# Patient Record
Sex: FEMALE | Race: WHITE | NOT HISPANIC OR LATINO | Employment: STUDENT | URBAN - METROPOLITAN AREA
[De-identification: names, ages, dates, MRNs, and addresses within clinical notes are randomized per-mention and may not be internally consistent; named-entity substitution may affect disease eponyms.]

---
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
## 2021-01-07 ENCOUNTER — OFFICE VISIT (OUTPATIENT)
Dept: CARDIOLOGY CLINIC | Facility: CLINIC | Age: 21
End: 2021-01-07
Payer: COMMERCIAL

## 2021-01-07 VITALS
HEART RATE: 70 BPM | DIASTOLIC BLOOD PRESSURE: 72 MMHG | SYSTOLIC BLOOD PRESSURE: 114 MMHG | OXYGEN SATURATION: 98 % | BODY MASS INDEX: 22.44 KG/M2 | WEIGHT: 143 LBS | HEIGHT: 67 IN | TEMPERATURE: 97.7 F

## 2021-01-07 DIAGNOSIS — E78.5 HYPERLIPIDEMIA, UNSPECIFIED HYPERLIPIDEMIA TYPE: Primary | ICD-10-CM

## 2021-01-07 PROCEDURE — 99213 OFFICE O/P EST LOW 20 MIN: CPT | Performed by: INTERNAL MEDICINE

## 2021-01-08 PROCEDURE — 93000 ELECTROCARDIOGRAM COMPLETE: CPT | Performed by: INTERNAL MEDICINE

## 2021-01-08 NOTE — PROGRESS NOTES
Subjective: José Miguel Zhao is here for follow up of dyslipidemia  Compliance with diet  has been inadequate  The patient exercises rarely  She denies any complaints at this time  Had repeat blood work done after stopping OCP  TG have improved but LDL has increased  The following portions of the patient's history were reviewed and updated as appropriate: allergies, current medications, past family history, past medical history, past social history, past surgical history and problem list     Review of Systems   Constitutional: Negative for chills and fever  HENT: Negative for ear pain and sore throat  Eyes: Negative for pain and visual disturbance  Respiratory: Negative for cough and shortness of breath  Cardiovascular: Negative for chest pain and palpitations  Gastrointestinal: Negative for abdominal pain and vomiting  Genitourinary: Negative for dysuria and hematuria  Musculoskeletal: Negative for arthralgias and back pain  Skin: Negative for color change and rash  Neurological: Negative for seizures and syncope  All other systems reviewed and are negative  Objective:   /72 (BP Location: Right arm, Patient Position: Sitting, Cuff Size: Standard)   Pulse 70   Temp 97 7 °F (36 5 °C) (Temporal)   Ht 5' 6 5" (1 689 m)   Wt 64 9 kg (143 lb)   SpO2 98%   BMI 22 74 kg/m²   Physical Exam   Constitutional: She appears healthy  No distress  Eyes: Pupils are equal, round, and reactive to light  Conjunctivae are normal    Musculoskeletal:         General: No edema  Neurological: She is alert and oriented to person, place, and time  Lab Review  Lab Results   Component Value Date    TRIG 70 12/22/2020    TRIG 156 (H) 08/15/2020    TRIG 138 (H) 07/21/2018    HDL 68 12/22/2020    HDL 87 08/15/2020    HDL 76 07/21/2018         Assessment:      Dyslipidemia under inadequate control  Plan:      1  Continue dietary measures  2  Continue regular exercise    3  Lipid-lowering medications: None indicated unless lipids or risk profile worsen in the future       4  Discussed referral to nutritionist   She is not interested at this time  I have spent 20 minutes with Patient and family today in which greater than 50% of this time was spent in counseling/coordination of care regarding Diagnostic results, Intructions for management, Importance of tx compliance, Risk factor reductions and Impressions

## 2021-04-19 DIAGNOSIS — Z23 ENCOUNTER FOR IMMUNIZATION: ICD-10-CM

## 2021-07-23 ENCOUNTER — TELEPHONE (OUTPATIENT)
Dept: CARDIOLOGY CLINIC | Facility: CLINIC | Age: 21
End: 2021-07-23

## 2021-07-23 LAB
ALBUMIN SERPL-MCNC: 4 G/DL (ref 3.9–5)
ALBUMIN/GLOB SERPL: 2 {RATIO} (ref 1.2–2.2)
ALP SERPL-CCNC: 60 IU/L (ref 45–106)
ALT SERPL-CCNC: 12 IU/L (ref 0–32)
AST SERPL-CCNC: 15 IU/L (ref 0–40)
BILIRUB SERPL-MCNC: 1.4 MG/DL (ref 0–1.2)
BUN SERPL-MCNC: 11 MG/DL (ref 6–20)
BUN/CREAT SERPL: 14 (ref 9–23)
CALCIUM SERPL-MCNC: 9.5 MG/DL (ref 8.7–10.2)
CHLORIDE SERPL-SCNC: 103 MMOL/L (ref 96–106)
CHOLEST SERPL-MCNC: 219 MG/DL (ref 100–169)
CO2 SERPL-SCNC: 23 MMOL/L (ref 20–29)
CREAT SERPL-MCNC: 0.79 MG/DL (ref 0.57–1)
GLOBULIN SER-MCNC: 2 G/DL (ref 1.5–4.5)
GLUCOSE SERPL-MCNC: 88 MG/DL (ref 65–99)
HDLC SERPL-MCNC: 78 MG/DL
LDLC SERPL CALC-MCNC: 127 MG/DL (ref 0–109)
LDLC/HDLC SERPL: 1.6 RATIO (ref 0–3.2)
POTASSIUM SERPL-SCNC: 4.2 MMOL/L (ref 3.5–5.2)
PROT SERPL-MCNC: 6 G/DL (ref 6–8.5)
SL AMB EGFR AFRICAN AMERICAN: 125 ML/MIN/1.73
SL AMB EGFR NON AFRICAN AMERICAN: 109 ML/MIN/1.73
SL AMB VLDL CHOLESTEROL CALC: 14 MG/DL (ref 5–40)
SODIUM SERPL-SCNC: 139 MMOL/L (ref 134–144)
TRIGL SERPL-MCNC: 79 MG/DL (ref 0–89)
TSH SERPL DL<=0.005 MIU/L-ACNC: 1.65 UIU/ML (ref 0.45–4.5)

## 2021-07-23 NOTE — TELEPHONE ENCOUNTER
----- Message from Bradley Marcos DO sent at 7/23/2021  2:30 PM EDT -----  Can you please let the patient know blood work was normal

## 2021-07-28 ENCOUNTER — OFFICE VISIT (OUTPATIENT)
Dept: CARDIOLOGY CLINIC | Facility: CLINIC | Age: 21
End: 2021-07-28
Payer: COMMERCIAL

## 2021-07-28 VITALS
HEIGHT: 67 IN | WEIGHT: 136 LBS | DIASTOLIC BLOOD PRESSURE: 68 MMHG | TEMPERATURE: 98.3 F | HEART RATE: 75 BPM | SYSTOLIC BLOOD PRESSURE: 102 MMHG | BODY MASS INDEX: 21.35 KG/M2 | OXYGEN SATURATION: 98 %

## 2021-07-28 DIAGNOSIS — E78.5 HYPERLIPIDEMIA, UNSPECIFIED HYPERLIPIDEMIA TYPE: Primary | ICD-10-CM

## 2021-07-28 PROCEDURE — 99213 OFFICE O/P EST LOW 20 MIN: CPT | Performed by: INTERNAL MEDICINE

## 2021-07-28 NOTE — PROGRESS NOTES
Subjective: Landon Hart is here for follow up of dyslipidemia  Compliance with diet  has been adequate  The patient exercises daily  She denies any complaints at this time  lipid panel was done on July 22, 2021 which showed LDL of 127 and triglycerides of 79  LDL in December was 151 and triglycerides were 70  The following portions of the patient's history were reviewed and updated as appropriate: allergies, current medications, past family history, past medical history, past social history, past surgical history and problem list     Review of Systems   Constitutional: Negative for chills and fever  HENT: Negative for ear pain and sore throat  Eyes: Negative for pain and visual disturbance  Respiratory: Negative for cough and shortness of breath  Cardiovascular: Negative for chest pain and palpitations  Gastrointestinal: Negative for abdominal pain and vomiting  Genitourinary: Negative for dysuria and hematuria  Musculoskeletal: Negative for arthralgias and back pain  Skin: Negative for color change and rash  Neurological: Negative for seizures and syncope  All other systems reviewed and are negative  Objective:   /68 (BP Location: Right arm, Patient Position: Sitting, Cuff Size: Standard)   Pulse 75   Temp 98 3 °F (36 8 °C) (Temporal)   Ht 5' 6 5" (1 689 m)   Wt 61 7 kg (136 lb)   SpO2 98%   BMI 21 62 kg/m²   Physical Exam   Constitutional: She appears healthy  No distress  Eyes: Pupils are equal, round, and reactive to light  Conjunctivae are normal    Musculoskeletal:      Cervical back: Neck supple  Neurological: She is alert and oriented to person, place, and time  Lab Review  Lab Results   Component Value Date    TRIG 79 07/22/2021    TRIG 70 12/22/2020    TRIG 156 (H) 08/15/2020    HDL 78 07/22/2021    HDL 68 12/22/2020    HDL 87 08/15/2020         Assessment:      Dyslipidemia under satisfactory control  Plan:      1   Continue dietary measures  LDL has significantly improved  2  Continue regular exercise  3  Lipid-lowering medications: None indicated unless lipids or risk profile worsen in the future            I have spent 20 minutes with Patient and family today in which greater than 50% of this time was spent in counseling/coordination of care regarding Diagnostic results, Intructions for management, Importance of tx compliance, Risk factor reductions and Impressions

## 2021-07-29 ENCOUNTER — OFFICE VISIT (OUTPATIENT)
Dept: PEDIATRICS CLINIC | Age: 21
End: 2021-07-29
Payer: COMMERCIAL

## 2021-07-29 VITALS
HEART RATE: 76 BPM | HEIGHT: 67 IN | WEIGHT: 137 LBS | RESPIRATION RATE: 16 BRPM | SYSTOLIC BLOOD PRESSURE: 110 MMHG | TEMPERATURE: 98.4 F | DIASTOLIC BLOOD PRESSURE: 70 MMHG | BODY MASS INDEX: 21.5 KG/M2

## 2021-07-29 DIAGNOSIS — Z00.00 WELL ADULT EXAM: Primary | ICD-10-CM

## 2021-07-29 DIAGNOSIS — B36.0 TINEA VERSICOLOR: ICD-10-CM

## 2021-07-29 DIAGNOSIS — Z23 NEED FOR DIPHTHERIA-TETANUS-PERTUSSIS (TDAP) VACCINE: ICD-10-CM

## 2021-07-29 DIAGNOSIS — N63.20 BREAST MASS, LEFT: ICD-10-CM

## 2021-07-29 DIAGNOSIS — Z13.31 NEGATIVE DEPRESSION SCREENING: ICD-10-CM

## 2021-07-29 PROCEDURE — 99395 PREV VISIT EST AGE 18-39: CPT | Performed by: PEDIATRICS

## 2021-07-29 PROCEDURE — 90715 TDAP VACCINE 7 YRS/> IM: CPT

## 2021-07-29 PROCEDURE — 90471 IMMUNIZATION ADMIN: CPT

## 2021-07-29 PROCEDURE — 93000 ELECTROCARDIOGRAM COMPLETE: CPT | Performed by: INTERNAL MEDICINE

## 2021-07-29 RX ORDER — KETOCONAZOLE 20 MG/G
CREAM TOPICAL DAILY
Qty: 30 G | Refills: 1 | Status: SHIPPED | OUTPATIENT
Start: 2021-07-29 | End: 2021-11-12

## 2021-07-29 NOTE — PROGRESS NOTES
Subjective: Anthony Duran is a 24 y o  female who is brought in for this well child visit  History provided by: patient    Current Issues:  Current concerns: none  regular periods, no issues    The following portions of the patient's history were reviewed and updated as appropriate:   She  has a past medical history of Allergy to insects, Exercise-induced asthma (1/10/2017), and Hematuria  She   Patient Active Problem List    Diagnosis Date Noted    Need for diphtheria-tetanus-pertussis (Tdap) vaccine 07/29/2021    Tinea versicolor 07/29/2021    Breast fibroadenoma in female, left 07/31/2020    Breast mass, left 07/28/2020    Negative depression screening 07/28/2020    BMI 20 0-20 9, adult 07/28/2020    Well adult exam 07/28/2020    Fatigue 11/07/2019    Tonsil stone 01/07/2019    Exercise-induced asthma 01/10/2017    Hives 12/10/2015    High cholesterol 04/08/2015     She  has a past surgical history that includes No past surgeries and [place] tooth extraction  Her family history includes Colon cancer in her family; Heart disease in her family; No Known Problems in her father and mother  She  reports that she has never smoked  She has never used smokeless tobacco  She reports that she does not drink alcohol and does not use drugs    Current Outpatient Medications   Medication Sig Dispense Refill    ACZONE 7 5 % GEL APPLY A PEA SIZE AMOUNT TO FACE AT BEDTIME (Patient not taking: Reported on 7/28/2021)  0    albuterol (PROAIR HFA) 90 mcg/act inhaler Inhale 2 puffs every 6 (six) hours as needed for wheezing (coughing  and  wheezing) 1 Inhaler 3    clindamycin (CLEOCIN T) 1 %   0    drospirenone-ethinyl estradiol (MARINO) 3-0 02 MG per tablet   0    EPINEPHrine (EPIPEN) 0 3 mg/0 3 mL SOAJ Inject 0 3 mL (0 3 mg total) into a muscle once for 1 dose 0 6 mL 2    ketoconazole (NIZORAL) 2 % cream Apply topically daily for 14 days 30 g 1    QVAR REDIHALER 80 MCG/ACT inhaler Inhale 1 puff 2 (two) times a day Rinse mouth after use 1 Inhaler 3     No current facility-administered medications for this visit  Current Outpatient Medications on File Prior to Visit   Medication Sig    ACZONE 7 5 % GEL APPLY A PEA SIZE AMOUNT TO FACE AT BEDTIME (Patient not taking: Reported on 7/28/2021)    albuterol (PROAIR HFA) 90 mcg/act inhaler Inhale 2 puffs every 6 (six) hours as needed for wheezing (coughing  and  wheezing)    clindamycin (CLEOCIN T) 1 %     drospirenone-ethinyl estradiol (MARINO) 3-0 02 MG per tablet     EPINEPHrine (EPIPEN) 0 3 mg/0 3 mL SOAJ Inject 0 3 mL (0 3 mg total) into a muscle once for 1 dose    QVAR REDIHALER 80 MCG/ACT inhaler Inhale 1 puff 2 (two) times a day Rinse mouth after use     No current facility-administered medications on file prior to visit  She is allergic to bee venom       Well Child Assessment:  Elmer [place] lives with her mother and sister  Interval problems do not include recent illness or recent injury  Nutrition  Types of intake include vegetables, fruits, meats, eggs, fish, juices and junk food  Junk food includes fast food, desserts and soda (Limited)  Dental  The patient has a dental home  The patient brushes teeth regularly  The patient does not floss regularly  Last dental exam was 6-12 months ago  Elimination  Elimination problems do not include constipation, diarrhea or urinary symptoms  There is no bed wetting  Behavioral  Disciplinary methods include scolding and praising good behavior  Sleep  Average sleep duration (hrs): 7  The patient does not snore  There are no sleep problems  Safety  There is no smoking in the home  Home has working smoke alarms? yes  Home has working carbon monoxide alarms? yes  There is no gun in home  School  Grade level in school: College  Child is doing well in school  Social  Sibling interactions are good  Screen time per day: Over 2 hours  Review of Systems   Constitutional: Negative for chills and fever     HENT: Negative for ear pain and sore throat  Eyes: Negative for pain and visual disturbance  Respiratory: Negative for snoring, cough and shortness of breath  Cardiovascular: Negative for chest pain and palpitations  Gastrointestinal: Negative for abdominal pain, constipation, diarrhea and vomiting  Genitourinary: Negative for dysuria and hematuria  Musculoskeletal: Negative for arthralgias and back pain  Skin: Positive for rash  Negative for color change  Neurological: Negative for seizures and syncope  Psychiatric/Behavioral: Negative for sleep disturbance  All other systems reviewed and are negative  Objective:       Vitals:    07/29/21 0952   BP: 110/70   BP Location: Left arm   Patient Position: Sitting   Cuff Size: Standard   Pulse: 76   Resp: 16   Temp: 98 4 °F (36 9 °C)   TempSrc: Temporal   Weight: 62 1 kg (137 lb)   Height: 5' 7" (1 702 m)     Growth parameters are noted and are appropriate for age  Wt Readings from Last 1 Encounters:   07/29/21 62 1 kg (137 lb) (65 %, Z= 0 39)*     * Growth percentiles are based on CDC (Girls, 2-20 Years) data  Ht Readings from Last 1 Encounters:   07/29/21 5' 7" (1 702 m) (86 %, Z= 1 06)*     * Growth percentiles are based on CDC (Girls, 2-20 Years) data  Body mass index is 21 46 kg/m²  Vitals:    07/29/21 0952   BP: 110/70   BP Location: Left arm   Patient Position: Sitting   Cuff Size: Standard   Pulse: 76   Resp: 16   Temp: 98 4 °F (36 9 °C)   TempSrc: Temporal   Weight: 62 1 kg (137 lb)   Height: 5' 7" (1 702 m)        Hearing Screening    125Hz 250Hz 500Hz 1000Hz 2000Hz 3000Hz 4000Hz 6000Hz 8000Hz   Right ear:            Left ear:            Comments: No OAE performed     Vision Screening Comments: No Snellen exam performed     Physical Exam  Vitals and nursing note reviewed  Exam conducted with a chaperone present ASHWIN Nevarez  Constitutional:       General: She is not in acute distress       Appearance: Normal appearance  She is well-developed and normal weight  She is not ill-appearing  HENT:      Head: Normocephalic and atraumatic  Right Ear: Tympanic membrane and external ear normal       Left Ear: Tympanic membrane and external ear normal       Nose: Nose normal       Mouth/Throat:      Mouth: Mucous membranes are moist       Pharynx: Oropharynx is clear  No oropharyngeal exudate or posterior oropharyngeal erythema  Eyes:      General:         Right eye: No discharge  Left eye: No discharge  Conjunctiva/sclera: Conjunctivae normal       Pupils: Pupils are equal, round, and reactive to light  Comments: Fundi clear   Neck:      Thyroid: No thyromegaly  Cardiovascular:      Rate and Rhythm: Normal rate and regular rhythm  Pulses: Normal pulses  Heart sounds: Normal heart sounds  No murmur heard  Pulmonary:      Effort: Pulmonary effort is normal  No respiratory distress  Breath sounds: Normal breath sounds  No wheezing or rales  Abdominal:      General: Bowel sounds are normal  There is no distension  Palpations: Abdomen is soft  There is no mass  Tenderness: There is no abdominal tenderness  There is no right CVA tenderness, left CVA tenderness or guarding  Genitourinary:     Comments: Saw 5  Musculoskeletal:         General: Normal range of motion  Cervical back: Normal range of motion and neck supple  Comments: No scoliosis   Lymphadenopathy:      Cervical: No cervical adenopathy  Skin:     General: Skin is dry  Comments: Hypopigmented macular lesion between the scapula   Neurological:      Mental Status: She is alert and oriented to person, place, and time  Cranial Nerves: No cranial nerve deficit  Motor: No abnormal muscle tone  Deep Tendon Reflexes: Reflexes are normal and symmetric  Reflexes normal    Psychiatric:         Mood and Affect: Mood normal          Behavior: Behavior normal          Thought Content:  Thought content normal          Judgment: Judgment normal            Assessment:     Well adolescent  1  Well adult exam     2  Need for diphtheria-tetanus-pertussis (Tdap) vaccine  TDAP VACCINE GREATER THAN OR EQUAL TO 6YO IM   3  Tinea versicolor  ketoconazole (NIZORAL) 2 % cream   4  Breast mass, left  US breast left limited (diagnostic)   5  Negative depression screening          Plan:     Has a history of a small left breast mass  I will follow it up with an ultrasound  1  Anticipatory guidance discussed  Specific topics reviewed: breast self-exam, drugs, ETOH, and tobacco, importance of regular exercise, importance of varied diet, limit TV, media violence, minimize junk food, seat belts and sex; STD and pregnancy prevention  2  Development: appropriate for age    1  Immunizations today: per orders  Vaccine Counseling: Discussed with: Ped parent/guardian: patient  The benefits, contraindication and side effects for the following vaccines were reviewed: Immunization component list: Tetanus, Diphtheria and pertussis  Total number of components reveiwed:3    4  Follow-up visit in 1 year for next well child visit, or sooner as needed

## 2021-08-18 ENCOUNTER — HOSPITAL ENCOUNTER (OUTPATIENT)
Dept: RADIOLOGY | Facility: HOSPITAL | Age: 21
Discharge: HOME | End: 2021-08-18
Attending: PEDIATRICS
Payer: COMMERCIAL

## 2021-08-18 DIAGNOSIS — N63.20 BREAST MASS, LEFT: ICD-10-CM

## 2021-08-18 PROCEDURE — 76642 ULTRASOUND BREAST LIMITED: CPT

## 2021-08-21 DIAGNOSIS — L50.9 HIVES: ICD-10-CM

## 2021-08-21 RX ORDER — EPINEPHRINE 0.3 MG/.3ML
0.3 INJECTION SUBCUTANEOUS ONCE
Qty: 0.6 ML | Refills: 2 | Status: SHIPPED | OUTPATIENT
Start: 2021-08-21 | End: 2021-11-12

## 2021-10-08 ENCOUNTER — OFFICE VISIT (OUTPATIENT)
Dept: PEDIATRICS CLINIC | Age: 21
End: 2021-10-08
Payer: COMMERCIAL

## 2021-10-08 VITALS — BODY MASS INDEX: 22.08 KG/M2 | WEIGHT: 141 LBS | SYSTOLIC BLOOD PRESSURE: 116 MMHG | DIASTOLIC BLOOD PRESSURE: 72 MMHG

## 2021-10-08 DIAGNOSIS — J45.31 MILD PERSISTENT REACTIVE AIRWAY DISEASE WITH ACUTE EXACERBATION: Primary | ICD-10-CM

## 2021-10-08 DIAGNOSIS — R09.81 NASAL CONGESTION: ICD-10-CM

## 2021-10-08 PROBLEM — R53.83 FATIGUE: Status: RESOLVED | Noted: 2019-11-07 | Resolved: 2021-10-08

## 2021-10-08 PROBLEM — B36.0 TINEA VERSICOLOR: Status: RESOLVED | Noted: 2021-07-29 | Resolved: 2021-10-08

## 2021-10-08 PROCEDURE — 99213 OFFICE O/P EST LOW 20 MIN: CPT | Performed by: PEDIATRICS

## 2021-10-08 RX ORDER — DROSPIRENONE AND ETHINYL ESTRADIOL 0.02-3(28)
1 KIT ORAL DAILY
COMMUNITY
Start: 2021-08-06 | End: 2022-08-06

## 2021-10-08 RX ORDER — AZITHROMYCIN 250 MG/1
TABLET, FILM COATED ORAL
Qty: 6 TABLET | Refills: 0 | Status: SHIPPED | OUTPATIENT
Start: 2021-10-08 | End: 2021-10-12

## 2021-10-08 RX ORDER — BECLOMETHASONE DIPROPIONATE HFA 40 UG/1
2 AEROSOL, METERED RESPIRATORY (INHALATION) 2 TIMES DAILY
Qty: 1 G | Refills: 1 | Status: SHIPPED | OUTPATIENT
Start: 2021-10-08

## 2021-10-08 RX ORDER — ALBUTEROL SULFATE 90 UG/1
2 AEROSOL, METERED RESPIRATORY (INHALATION) 4 TIMES DAILY
Qty: 1 G | Refills: 1 | Status: SHIPPED | OUTPATIENT
Start: 2021-10-08

## 2021-11-12 ENCOUNTER — OFFICE VISIT (OUTPATIENT)
Dept: FAMILY MEDICINE CLINIC | Facility: CLINIC | Age: 21
End: 2021-11-12
Payer: COMMERCIAL

## 2021-11-12 VITALS
HEIGHT: 66 IN | SYSTOLIC BLOOD PRESSURE: 98 MMHG | WEIGHT: 139 LBS | DIASTOLIC BLOOD PRESSURE: 64 MMHG | BODY MASS INDEX: 22.34 KG/M2 | HEART RATE: 80 BPM | RESPIRATION RATE: 18 BRPM | OXYGEN SATURATION: 97 % | TEMPERATURE: 97.7 F

## 2021-11-12 DIAGNOSIS — N76.0 ACUTE VAGINITIS: Primary | ICD-10-CM

## 2021-11-12 PROCEDURE — 99213 OFFICE O/P EST LOW 20 MIN: CPT | Performed by: NURSE PRACTITIONER

## 2021-11-12 PROCEDURE — 1036F TOBACCO NON-USER: CPT | Performed by: NURSE PRACTITIONER

## 2021-11-12 PROCEDURE — 3725F SCREEN DEPRESSION PERFORMED: CPT | Performed by: NURSE PRACTITIONER

## 2021-11-12 PROCEDURE — 3008F BODY MASS INDEX DOCD: CPT | Performed by: NURSE PRACTITIONER

## 2021-11-12 RX ORDER — NITROFURANTOIN 25; 75 MG/1; MG/1
CAPSULE ORAL
COMMUNITY
Start: 2021-10-28 | End: 2021-11-12

## 2021-11-12 RX ORDER — FLUCONAZOLE 150 MG/1
TABLET ORAL
Qty: 2 TABLET | Refills: 0 | Status: SHIPPED | OUTPATIENT
Start: 2021-11-12 | End: 2021-11-13

## 2021-12-26 ENCOUNTER — HOSPITAL ENCOUNTER (EMERGENCY)
Facility: HOSPITAL | Age: 21
Discharge: HOME | End: 2021-12-26
Attending: EMERGENCY MEDICINE | Admitting: EMERGENCY MEDICINE
Payer: COMMERCIAL

## 2021-12-26 VITALS
OXYGEN SATURATION: 97 % | RESPIRATION RATE: 16 BRPM | DIASTOLIC BLOOD PRESSURE: 66 MMHG | SYSTOLIC BLOOD PRESSURE: 116 MMHG | HEART RATE: 97 BPM | TEMPERATURE: 98.8 F

## 2021-12-26 DIAGNOSIS — R51.9 HEADACHE: Primary | ICD-10-CM

## 2021-12-26 PROCEDURE — U0005 INFEC AGEN DETEC AMPLI PROBE: HCPCS | Performed by: EMERGENCY MEDICINE

## 2021-12-26 PROCEDURE — U0003 INFECTIOUS AGENT DETECTION BY NUCLEIC ACID (DNA OR RNA); SEVERE ACUTE RESPIRATORY SYNDROME CORONAVIRUS 2 (SARS-COV-2) (CORONAVIRUS DISEASE [COVID-19]), AMPLIFIED PROBE TECHNIQUE, MAKING USE OF HIGH THROUGHPUT TECHNOLOGIES AS DESCRIBED BY CMS-2020-01-R: HCPCS | Performed by: EMERGENCY MEDICINE

## 2021-12-26 PROCEDURE — 99284 EMERGENCY DEPT VISIT MOD MDM: CPT | Performed by: EMERGENCY MEDICINE

## 2021-12-26 PROCEDURE — 99283 EMERGENCY DEPT VISIT LOW MDM: CPT

## 2021-12-26 NOTE — ED PROVIDER NOTES
History  Chief Complaint   Patient presents with    Headache     c/o headache since 11am with vision feeling like static in L eye since then and having lethargy,     Rash     some redness to abdomen noted     23 yo female noted things seemed louder than normal at gym this morning and she noted red bumps on her abdomen while working out  Did the eliptical and squats, no weights  On way home from gym she developed right sided headache with blurry vision  Had some nausea but no vomiting  Took a nap but still had head pain  Took tylenol and came in here and while in waiting room for 4 hours felt better  All symptoms improved and she feels fine  No recent fever, cough  Pt  Is vaccinated  History provided by:  Parent   used: No    Headache  Associated symptoms: photophobia    Associated symptoms: no abdominal pain, no back pain, no cough, no diarrhea, no dizziness, no fever, no myalgias, no nausea and no vomiting    Rash  Associated symptoms: headaches    Associated symptoms: no abdominal pain, no diarrhea, no fever, no myalgias, no nausea, no shortness of breath and not vomiting        Prior to Admission Medications   Prescriptions Last Dose Informant Patient Reported? Taking?    EPINEPHrine (EPIPEN) 0 3 mg/0 3 mL SOAJ   No No   Sig: Inject 0 3 mL (0 3 mg total) into a muscle once for 1 dose   QVAR REDIHALER 80 MCG/ACT inhaler  Self No No   Sig: Inhale 1 puff 2 (two) times a day Rinse mouth after use   Patient not taking: Reported on 11/12/2021    albuterol (PROAIR HFA) 90 mcg/act inhaler  Self No No   Sig: Inhale 2 puffs every 6 (six) hours as needed for wheezing (coughing  and  wheezing)   albuterol (ProAir HFA) 90 mcg/act inhaler   No No   Sig: Inhale 2 puffs 4 (four) times a day   beclomethasone (Qvar RediHaler) 40 MCG/ACT inhaler   No No   Sig: Inhale 2 puffs 2 (two) times a day   clindamycin (CLEOCIN T) 1 %  Self Yes No   drospirenone-ethinyl estradiol (MARINO) 3-0 02 MG per tablet Yes No   Sig: Take 1 tablet by mouth daily   ketoconazole (NIZORAL) 2 % cream   No No   Sig: Apply topically daily for 14 days      Facility-Administered Medications: None       Past Medical History:   Diagnosis Date    Allergy to insects     last assessed 8/12/13, resolved 6/26/15    Exercise-induced asthma 1/10/2017    Fatigue 11/7/2019    Hematuria     last assessed 8/9/13, resolved 7/21/14    Hives 12/10/2015    Tinea versicolor 7/29/2021       Past Surgical History:   Procedure Laterality Date    NO PAST SURGERIES      WISDOM TOOTH EXTRACTION         Family History   Problem Relation Age of Onset    No Known Problems Mother     No Known Problems Father     Colon cancer Family     Heart disease Family      I have reviewed and agree with the history as documented  E-Cigarette/Vaping    E-Cigarette Use Never User      E-Cigarette/Vaping Substances    Nicotine No     THC No     CBD No     Flavoring No      Social History     Tobacco Use    Smoking status: Never Smoker    Smokeless tobacco: Never Used   Vaping Use    Vaping Use: Never used   Substance Use Topics    Alcohol use: No    Drug use: Never       Review of Systems   Constitutional: Negative  Negative for fever  HENT: Negative  Eyes: Positive for photophobia and visual disturbance  Respiratory: Negative  Negative for cough and shortness of breath  Cardiovascular: Negative  Negative for chest pain  Gastrointestinal: Negative  Negative for abdominal pain, diarrhea, nausea and vomiting  Genitourinary: Negative  Negative for dysuria and flank pain  Musculoskeletal: Negative  Negative for back pain and myalgias  Skin: Positive for rash  Neurological: Positive for headaches  Negative for dizziness  Hematological: Does not bruise/bleed easily  Psychiatric/Behavioral: Negative  All other systems reviewed and are negative  Physical Exam  Physical Exam  Vitals and nursing note reviewed     Constitutional: General: She is not in acute distress  Appearance: She is well-developed  She is not ill-appearing, toxic-appearing or diaphoretic  HENT:      Head: Normocephalic and atraumatic  Right Ear: External ear normal       Left Ear: External ear normal    Eyes:      General: No scleral icterus  Extraocular Movements: Extraocular movements intact  Conjunctiva/sclera: Conjunctivae normal       Pupils: Pupils are equal, round, and reactive to light  Cardiovascular:      Rate and Rhythm: Normal rate and regular rhythm  Heart sounds: Normal heart sounds  No murmur heard  Pulmonary:      Effort: Pulmonary effort is normal  No respiratory distress  Breath sounds: Normal breath sounds  Abdominal:      General: Bowel sounds are normal  There is no distension  Palpations: Abdomen is soft  Tenderness: There is no abdominal tenderness  Musculoskeletal:         General: No deformity  Normal range of motion  Cervical back: Normal range of motion and neck supple  No tenderness  Right lower leg: No edema  Left lower leg: No edema  Skin:     General: Skin is warm and dry  Coloration: Skin is not pale  Findings: No rash  Neurological:      General: No focal deficit present  Mental Status: She is alert and oriented to person, place, and time  Cranial Nerves: No cranial nerve deficit  Motor: No weakness        Comments: Speech intact, motor intact, finger to nose intact   Psychiatric:         Mood and Affect: Mood normal          Behavior: Behavior normal          Vital Signs  ED Triage Vitals [12/26/21 1754]   Temperature Pulse Respirations Blood Pressure SpO2   98 8 °F (37 1 °C) 97 16 116/66 97 %      Temp Source Heart Rate Source Patient Position - Orthostatic VS BP Location FiO2 (%)   Tympanic Monitor Sitting Right arm --      Pain Score       4           Vitals:    12/26/21 1754   BP: 116/66   Pulse: 97   Patient Position - Orthostatic VS: Sitting         Visual Acuity      ED Medications  Medications - No data to display    Diagnostic Studies  Results Reviewed     Procedure Component Value Units Date/Time    COVID only - 48 hour TAT [[id number]]     Lab Status: No result Specimen: Nares from Nose                  No orders to display              Procedures  Procedures         ED Course                                             MDM  Number of Diagnoses or Management Options  Headache  Diagnosis management comments: Symptoms have resolved and pt  Feels fine  I offered to do blood work, IV and CT scan but advised they would likely be here another few hours  They chose not to do the tests at time but mom does want a covid test   Advised rest, fluids, tylenol/advil prn, follow up if any problems  Disposition  Final diagnoses:   Headache     Time reflects when diagnosis was documented in both MDM as applicable and the Disposition within this note     Time User Action Codes Description Comment    60/70/4687  1:49 PM Karol Tubbs [F23 2] Headache       ED Disposition     ED Disposition Condition Date/Time Comment    Discharge Stable Sun Dec 26, 2021  6:40 PM Belynda Gitelman discharge to home/self care  Follow-up Information     Follow up With Specialties Details Why Contact Info    Yuliya Steinberg MD Pediatrics  As needed [facility]  [address] [place] [address]  [phone]            Patient's Medications   Discharge Prescriptions    No medications on file       No discharge procedures on file      PDMP Review     None          ED Provider  Electronically Signed by           Daryl Gordon MD  49/11/29 8206

## 2021-12-26 NOTE — DISCHARGE INSTRUCTIONS
Your headache has resolved and your exam and vital signs are normal   You may have had a migraine  We will call you in 24-48 hours with covid test results  Rest at home, keep hydrated  It is fine to use tylenol and/or advil as needed for discomfort

## 2021-12-27 ENCOUNTER — TELEPHONE (OUTPATIENT)
Dept: FAMILY MEDICINE CLINIC | Facility: CLINIC | Age: 21
End: 2021-12-27

## 2021-12-27 LAB — SARS-COV-2 RNA RESP QL NAA+PROBE: NEGATIVE

## 2021-12-28 ENCOUNTER — TELEPHONE (OUTPATIENT)
Dept: EMERGENCY DEPT | Facility: HOSPITAL | Age: 21
End: 2021-12-28

## 2022-01-05 ENCOUNTER — OFFICE VISIT (OUTPATIENT)
Dept: PEDIATRICS CLINIC | Age: 22
End: 2022-01-05
Payer: COMMERCIAL

## 2022-01-05 VITALS
DIASTOLIC BLOOD PRESSURE: 70 MMHG | TEMPERATURE: 97.3 F | SYSTOLIC BLOOD PRESSURE: 110 MMHG | WEIGHT: 143 LBS | BODY MASS INDEX: 23.08 KG/M2

## 2022-01-05 DIAGNOSIS — M54.2 NECK PAIN: ICD-10-CM

## 2022-01-05 DIAGNOSIS — G43.109 MIGRAINE WITH AURA AND WITHOUT STATUS MIGRAINOSUS, NOT INTRACTABLE: Primary | ICD-10-CM

## 2022-01-05 PROCEDURE — 99214 OFFICE O/P EST MOD 30 MIN: CPT | Performed by: PEDIATRICS

## 2022-01-05 RX ORDER — SUMATRIPTAN 20 MG/1
1 SPRAY NASAL EVERY 2 HOUR PRN
Qty: 10 ACT | Refills: 3 | Status: SHIPPED | OUTPATIENT
Start: 2022-01-05 | End: 2022-01-11 | Stop reason: CLARIF

## 2022-01-05 NOTE — PROGRESS NOTES
Assessment/Plan:Need to make sure that she is well hydrated  I will prescribe Imitrex for now  Good sleep hygiene and exercise are recommended  I will send to physical therapy for the posterior neck pain  Follow up prn  She will stop her birth control pills for this month to see if it improves the headaches  Diagnoses and all orders for this visit:    Migraine with aura and without status migrainosus, not intractable  -     SUMAtriptan (IMITREX) 20 MG/ACT nasal spray; 1 spray (20 mg total) into each nostril every 2 (two) hours as needed for migraine May only take up to 2 doses  by 2 hours  Neck pain  -     Ambulatory referral to Physical Therapy; Future          Subjective:      Patient ID: Eran Landers is a 22 y o  female  Headache   This is a new problem  The current episode started 1 to 4 weeks ago (She was seen in the ED for an evaluation on 12/26/21 )  The problem occurs intermittently  The problem has been waxing and waning  The pain is located in the right unilateral region  The pain radiates to the face  The pain quality is not similar to prior headaches  The quality of the pain is described as stabbing  The pain is at a severity of 9/10  The pain is severe  Associated symptoms include blurred vision, dizziness, ear pain, eye pain, nausea, neck pain, numbness (left hand), phonophobia, photophobia, a visual change and weakness  Pertinent negatives include no abdominal pain, anorexia, fever, insomnia, sinus pressure, tingling or vomiting  Associated symptoms comments: Neck pain  Nothing aggravates the symptoms  She has tried acetaminophen, NSAIDs and Excedrin for the symptoms  The treatment provided moderate relief  Her past medical history is significant for migraines in the family and recent head traumas         The following portions of the patient's history were reviewed and updated as appropriate:   She  has a past medical history of Allergy to insects, Exercise-induced asthma (1/10/2017), Fatigue (11/7/2019), Hematuria, Hives (12/10/2015), and Tinea versicolor (7/29/2021)  She   Patient Active Problem List    Diagnosis Date Noted    Migraine with aura and without status migrainosus, not intractable 01/05/2022    Neck pain 01/05/2022    Need for diphtheria-tetanus-pertussis (Tdap) vaccine 07/29/2021    Breast fibroadenoma in female, left 07/31/2020    Breast mass, left 07/28/2020    Negative depression screening 07/28/2020    BMI 20 0-20 9, adult 07/28/2020    Well adult exam 07/28/2020    Tonsil stone 01/07/2019    Exercise-induced asthma 01/10/2017    High cholesterol 04/08/2015     She  has a past surgical history that includes No past surgeries and [place] tooth extraction  Her family history includes Colon cancer in her family; Heart disease in her family; No Known Problems in her father and mother  She  reports that she has never smoked  She has never used smokeless tobacco  She reports that she does not drink alcohol and does not use drugs    Current Outpatient Medications   Medication Sig Dispense Refill    albuterol (PROAIR HFA) 90 mcg/act inhaler Inhale 2 puffs every 6 (six) hours as needed for wheezing (coughing  and  wheezing) 1 Inhaler 3    albuterol (ProAir HFA) 90 mcg/act inhaler Inhale 2 puffs 4 (four) times a day 1 g 1    beclomethasone (Qvar RediHaler) 40 MCG/ACT inhaler Inhale 2 puffs 2 (two) times a day 1 g 1    clindamycin (CLEOCIN T) 1 %   0    drospirenone-ethinyl estradiol (MARINO) 3-0 02 MG per tablet Take 1 tablet by mouth daily      EPINEPHrine (EPIPEN) 0 3 mg/0 3 mL SOAJ Inject 0 3 mL (0 3 mg total) into a muscle once for 1 dose 0 6 mL 2    ketoconazole (NIZORAL) 2 % cream Apply topically daily for 14 days 30 g 1    QVAR REDIHALER 80 MCG/ACT inhaler Inhale 1 puff 2 (two) times a day Rinse mouth after use (Patient not taking: Reported on 11/12/2021 ) 1 Inhaler 3    SUMAtriptan (IMITREX) 20 MG/ACT nasal spray 1 spray (20 mg total) into each nostril every 2 (two) hours as needed for migraine May only take up to 2 doses  by 2 hours  10 Act 3     No current facility-administered medications for this visit  Current Outpatient Medications on File Prior to Visit   Medication Sig    albuterol (PROAIR HFA) 90 mcg/act inhaler Inhale 2 puffs every 6 (six) hours as needed for wheezing (coughing  and  wheezing)    albuterol (ProAir HFA) 90 mcg/act inhaler Inhale 2 puffs 4 (four) times a day    beclomethasone (Qvar RediHaler) 40 MCG/ACT inhaler Inhale 2 puffs 2 (two) times a day    clindamycin (CLEOCIN T) 1 %     drospirenone-ethinyl estradiol (MARINO) 3-0 02 MG per tablet Take 1 tablet by mouth daily    EPINEPHrine (EPIPEN) 0 3 mg/0 3 mL SOAJ Inject 0 3 mL (0 3 mg total) into a muscle once for 1 dose    ketoconazole (NIZORAL) 2 % cream Apply topically daily for 14 days    QVAR REDIHALER 80 MCG/ACT inhaler Inhale 1 puff 2 (two) times a day Rinse mouth after use (Patient not taking: Reported on 11/12/2021 )     No current facility-administered medications on file prior to visit  She is allergic to bee venom       Review of Systems   Constitutional: Negative for fatigue and fever  HENT: Positive for ear pain  Negative for sinus pressure  Eyes: Positive for blurred vision, photophobia and pain  Gastrointestinal: Positive for nausea  Negative for abdominal pain, anorexia and vomiting  Musculoskeletal: Positive for neck pain  Neurological: Positive for dizziness, weakness, numbness (left hand) and headaches  Negative for tingling, speech difficulty and light-headedness  Psychiatric/Behavioral: The patient does not have insomnia  Objective:      /70   Temp (!) 97 3 °F (36 3 °C)   Wt 64 9 kg (143 lb)   BMI 23 08 kg/m²          Physical Exam  Vitals reviewed  Constitutional:       General: She is not in acute distress  Appearance: Normal appearance  She is well-developed and normal weight   She is not ill-appearing, toxic-appearing or diaphoretic  HENT:      Head: Normocephalic and atraumatic  Right Ear: Tympanic membrane and external ear normal       Left Ear: Tympanic membrane and external ear normal       Nose: Nose normal  No congestion or rhinorrhea  Mouth/Throat:      Mouth: Mucous membranes are moist       Pharynx: Oropharynx is clear  No oropharyngeal exudate or posterior oropharyngeal erythema  Eyes:      General:         Right eye: No discharge  Left eye: No discharge  Extraocular Movements: Extraocular movements intact  Conjunctiva/sclera: Conjunctivae normal       Pupils: Pupils are equal, round, and reactive to light  Comments: Fundi seen   Cardiovascular:      Rate and Rhythm: Normal rate and regular rhythm  Heart sounds: Normal heart sounds  No murmur heard  Pulmonary:      Effort: Pulmonary effort is normal  No respiratory distress  Breath sounds: Normal breath sounds  No wheezing or rales  Abdominal:      General: Bowel sounds are normal  There is no distension  Palpations: Abdomen is soft  There is no mass  Tenderness: There is no abdominal tenderness  There is no guarding  Musculoskeletal:      Cervical back: Neck supple  Lymphadenopathy:      Cervical: No cervical adenopathy  Skin:     General: Skin is warm  Neurological:      General: No focal deficit present  Mental Status: She is alert  Cranial Nerves: Cranial nerves are intact  Motor: No weakness or tremor  Coordination: Coordination is intact  Romberg sign negative  Finger-Nose-Finger Test normal  Rapid alternating movements normal       Deep Tendon Reflexes: Reflexes normal       Reflex Scores:       Bicep reflexes are 2+ on the right side and 2+ on the left side  Patellar reflexes are 2+ on the right side and 2+ on the left side  Psychiatric:         Behavior: Behavior normal          Thought Content:  Thought content normal

## 2022-01-10 ENCOUNTER — EVALUATION (OUTPATIENT)
Dept: PHYSICAL THERAPY | Facility: CLINIC | Age: 22
End: 2022-01-10
Payer: COMMERCIAL

## 2022-01-10 DIAGNOSIS — M54.2 NECK PAIN: ICD-10-CM

## 2022-01-10 PROCEDURE — 97161 PT EVAL LOW COMPLEX 20 MIN: CPT

## 2022-01-10 NOTE — PROGRESS NOTES
PT Evaluation     Today's date: 1/10/2022  Patient name: Naya Weston  : 2000  MRN: [id number]  Referring provider: Deshawn Valenzuela MD  Dx:   Encounter Diagnosis     ICD-10-CM    1  Neck pain  M54 2 Ambulatory referral to Physical Therapy                  Assessment  Assessment details: Naya Weston is a 22 y o  female who presents to Physical Therapy at [place] [address] with subacute neck pain and suboccipital headaches  Patient presents with the following impairments: neck pain, migraine headaches, abnormal cervical AROM, and abnormal posture  Due to these impairments, patient has difficulty performing the following: turning head to the right, tilting head to the left, concentrating, thinking, driving, and participating in prior fitness regimen  Patient has been educated in home exercise program and plan of care  Patient was also educated in posture and relevant anatomy, along with its relation to migraine headaches  Patient would benefit from skilled physical therapy services to address the above functional limitations and progress towards prior level of function and independence with home exercise program   Impairments: abnormal muscle firing, abnormal or restricted ROM, activity intolerance, impaired physical strength, lacks appropriate home exercise program, pain with function, poor posture  and poor body mechanics  Understanding of Dx/Px/POC: good   Prognosis: good    Goals  Short Term Goals (3 weeks; target date: 22)  1  Patient will be independent with initial HEP  2  Patient will be able to self-correct posture in the clinic 50% of the time or greater  3  Patient will present with "good" deep neck flexor muscle group activation in supine  Long Term Goals (6 weeks; target date: 3/30/22)  1  Patient will demonstrate an increase in cervical AROM to WNL in order to promote self-care activities pain-free    2  Patient will be able to self-correct posture in the clinic 75% of the time or greater  3  Patient will present with decreased frequency of headache symptoms to 1-2x a month  4  Patient will be independent with comprehensive HEP  Plan  Patient would benefit from: skilled physical therapy  Planned modality interventions: cryotherapy, electrical stimulation/Russian stimulation, TENS, ultrasound, thermotherapy: hydrocollator packs, traction, high voltage pulsed current: spasm management and high voltage pulsed current: pain management  Planned therapy interventions: flexibility, functional ROM exercises, graded exercise, home exercise program, joint mobilization, manual therapy, neuromuscular re-education, patient education, strengthening, stretching, therapeutic exercise, therapeutic activities, balance/weight bearing training, gait training, abdominal trunk stabilization, self care, postural training, activity modification, ADL retraining, ADL training, balance, behavior modification, body mechanics training, breathing training, therapeutic training, transfer training, graded activity, graded motor, muscle pump exercises, Ibrahim taping and IADL retraining  Frequency: 2x week  Duration in weeks: 6  Plan of Care beginning date: 1/10/2022  Plan of Care expiration date: 4/10/2022  Treatment plan discussed with: patient        Subjective Evaluation    History of Present Illness  Date of onset: 12/26/2021  Mechanism of injury: Pt reports that she had migraines that began the day after Jeffrey that came on all of a sudden after coming home from the gym  Pt states that she did nothing more than usual while at the gym that day  Pt states that she felt like "everyone was screaming" in her head upon arriving at the gym  Pt states that due to neck pain, she is having migraines  Pt states that she has aches on the right side of the neck  Pt notes headaches in the right side of the front of the head and across the forehead   Pt states she saw her PCP for this problem 5 days ago after going to the ER for severe headache on 21  Pt states her PCP suspected that migraines are related to neck pain and referred pt to PT  Pt states that on and around 21, she did not have particularly high stress  Pt states that she is a sophomore at [employer] and will be returning to school in one week  Pt also states that 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, but now goes to the gym 3x/week for fitness  Pt states that she never had migraines before this episode began, but "always held tension in her shoulders" for years  Pain  Current pain rating: 3 (Headache: C: 5, B: 0, W: 9-10)  At best pain ratin  At worst pain ratin  Location: Neck  Quality: dull ache and tight  Relieving factors: heat  Progression: no change    Social Support    Employment status: working (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[address])  Hand dominance: right  Exercise history: 3x/week - strength training for whole body, cardio - treadmill, elliptical, bike       Diagnostic Tests  No diagnostic tests performed  Treatments  No previous or current treatments  Patient Goals  Patient goals for therapy: decreased pain          Objective     Concurrent Complaints  Positive for disturbed sleep, dizziness and headaches   Negative for night pain    Postural Observations  Seated posture: poor  Correction of posture: makes symptoms better    Additional Postural Observation Details  Increased TTP in R UT/LS > L UT/LS; L UT/LS TTP resolved with postural correction, minimal change in R UT/LS TTP with postural correction    Tenderness     Additional Tenderness Details  (+) in B/L suboccipitals with provocation of headache across the forehead    Neurological Testing     Sensation   Cervical/Thoracic   Left   Intact: light touch    Right   Intact: light touch    Active Range of Motion   Cervical/Thoracic Spine       Cervical    Flexion: 60 (Right-sided neck pain) degrees  with pain  Extension: 50 degrees      Left lateral flexion: 40 degrees     with pain  Right lateral flexion: 45 degrees      Left rotation: 80 degrees  Right rotation: 70 degrees    with pain    Strength/Myotome Testing   Cervical Spine     Left   Interossei strength (t1): 5  Neck lateral flexion (C3): 5    Right   Interossei strength (t1): 5  Neck lateral flexion (C3): 5    Left Shoulder     Planes of Motion   Flexion: 5   Abduction: 5     Right Shoulder     Planes of Motion   Flexion: 5   Abduction: 5   Neuro Exam:     Headaches   Patient reports headaches: Yes  Precautions: Standard; frequent migraines since 12/26/21     EVAL 2 3 4 5   Manuals 1/10/22       STM/MFR        Manual distraction, SOR Examined       Joint mobs                 Neuro Re-Ed        Chin tucks Instructed       Scap retractions Instructed       Rows        Shoulder extensions                                Ther Ex        Cervical AROM        Seated thoracic extension        Open book                                                Ther Activity        Pt education POC, HEP       Postural education Performed                                               Modalities                          Access Code: [email or web address]  URL: [email or web address]  Date: 01/10/2022  Prepared by: Crystal Christopher    Exercises  · Seated Cervical Retraction - 3 x daily - 7 x weekly - 2 sets - 10 reps - 3 hold  · Seated Scapular Retraction - 3 x daily - 7 x weekly - 2 sets - 10 reps - 3 hold

## 2022-01-11 ENCOUNTER — TELEPHONE (OUTPATIENT)
Dept: PEDIATRICS CLINIC | Age: 22
End: 2022-01-11

## 2022-01-18 ENCOUNTER — OFFICE VISIT (OUTPATIENT)
Dept: PHYSICAL THERAPY | Facility: CLINIC | Age: 22
End: 2022-01-18
Payer: COMMERCIAL

## 2022-01-18 DIAGNOSIS — M54.2 NECK PAIN: Primary | ICD-10-CM

## 2022-01-18 PROCEDURE — 97110 THERAPEUTIC EXERCISES: CPT

## 2022-01-18 PROCEDURE — 97112 NEUROMUSCULAR REEDUCATION: CPT

## 2022-01-18 PROCEDURE — 97140 MANUAL THERAPY 1/> REGIONS: CPT

## 2022-01-18 NOTE — PROGRESS NOTES
Daily Note      Today's date: 2022  Patient name: Claudell Quinones  : 2000  MRN: [id number]  Referring provider: Shannon Boone MD  Dx:   Encounter Diagnosis     ICD-10-CM    1  Neck pain  M54 2      (22) Pt has not been seen since above visit due to returning to university  Pt verbalized her plan to continue PT at another [address] [place] [address] outpatient location near school  Due to inactivity, this episode will be closed and a new episode will be opened should pt return for evaluation  Subjective: Pt reports that since initial evaluation, she has been consistently doing her exercises and also got a massage, which helped loosen up the muscles of the back of her neck  Pt states that since last week, she has only had one migraine  Pt states that she is returning to school this upcoming weekend and plans on transferring to a [facility] outpatient PT facility nearby to continue PT  Objective: See treatment diary below    Assessment: Pt tolerated treatment well  Pt demonstrates excellent carryover of initial HEP  Pt introduced to open book and thoracic extension and noted discomfort laying on left shoulder  Pt demonstrated moderate restriction with right rotation and mild restriction with left  Pt also introduced to rows and shoulder extensions and required verbal and tactile cues to decrease shoulder shrugging with rows  Pt instructed to continue with initial HEP and a comprehensive HEP will be provided prior to transferring facilities  Reviewed postural education with pt, especially while sitting and performing schoolwork  Plan: Continue per plan of care  Progress treatment as tolerated            Precautions: Standard; frequent migraines since 22     EVAL 2 3 4 5   Manuals 1/10/22 1/18/22      STM/MFR  R UT/LS in supine      Manual distraction, SOR Examined Performed      Joint mobs                 Neuro Re-Ed        Chin tucks Instructed 2x10 (3s)      Scap retractions Instructed 2x10 (3s)      Rows  2x10 green tubing      Shoulder extensions  2x10 GTB      B/L ER        Wall slides w/ lift off                 Ther Ex        Cervical AROM        Seated thoracic extension  2x10 over ball      Open book  10x5s  Quadruped rotation                                             Ther Activity        Pt education POC, HEP Reviewed      Postural education Performed Reviewed                                              Modalities        MHP cervical  10 min pre-tx                Access Code: [email or web address]  URL: [email or web address]  Date: 01/10/2022  Prepared by: Yessenia Rutledge    Exercises  · Seated Cervical Retraction - 3 x daily - 7 x weekly - 2 sets - 10 reps - 3 hold  · Seated Scapular Retraction - 3 x daily - 7 x weekly - 2 sets - 10 reps - 3 hold

## 2022-01-20 ENCOUNTER — APPOINTMENT (OUTPATIENT)
Dept: PHYSICAL THERAPY | Facility: CLINIC | Age: 22
End: 2022-01-20
Payer: COMMERCIAL

## 2022-06-24 ENCOUNTER — OFFICE VISIT (OUTPATIENT)
Dept: URGENT CARE | Facility: CLINIC | Age: 22
End: 2022-06-24
Payer: COMMERCIAL

## 2022-06-24 VITALS
HEART RATE: 59 BPM | DIASTOLIC BLOOD PRESSURE: 54 MMHG | TEMPERATURE: 98.2 F | BODY MASS INDEX: 23.46 KG/M2 | WEIGHT: 146 LBS | OXYGEN SATURATION: 99 % | RESPIRATION RATE: 20 BRPM | SYSTOLIC BLOOD PRESSURE: 107 MMHG | HEIGHT: 66 IN

## 2022-06-24 DIAGNOSIS — Z20.822 CLOSE EXPOSURE TO COVID-19 VIRUS: Primary | ICD-10-CM

## 2022-06-24 PROCEDURE — 1036F TOBACCO NON-USER: CPT | Performed by: PHYSICIAN ASSISTANT

## 2022-06-24 PROCEDURE — 3008F BODY MASS INDEX DOCD: CPT | Performed by: PHYSICIAN ASSISTANT

## 2022-06-24 PROCEDURE — 99213 OFFICE O/P EST LOW 20 MIN: CPT | Performed by: PHYSICIAN ASSISTANT

## 2022-06-24 PROCEDURE — 87636 SARSCOV2 & INF A&B AMP PRB: CPT | Performed by: PHYSICIAN ASSISTANT

## 2022-06-24 NOTE — LETTER
[facility] [employer]  [address] [address]  [phone]  Dept: [phone]    June 24, 2022    Patient: Rhiannon Stock  YOB: 2000    Rhiannon Stock was seen and evaluated at our [facility]  Please note if Covid and Flu tests are negative, they may return to work when fever free for 24 hours without the use of a fever reducing agent  If Covid or Flu test is positive, they may return to work on 06/27/2022, as this is 5 days from the onset of symptoms  Upon return, they must then adhere to strict masking for an additional 5 days      Sincerely,    Adrien Limon PA-C

## 2022-06-24 NOTE — PROGRESS NOTES
[facility]        NAME: Jose Antonio Chinchilla is a 22 y o  female  : 2000    MRN: [id number]  DATE: 2022  TIME: 12:38 PM    Assessment and Plan   Close exposure to COVID-19 virus [Z20 822]  1  Close exposure to COVID-19 virus  Cov/Flu-Collected at [facility] or [facility]         Patient Instructions     Patient Instructions   COVID/Flu swab performed in office, results to be in and 1-3 days, quarantine until results are in, isolation requirements provided  Continue over-the-counter ibuprofen/ Tylenol as needed for symptoms  Adequate rest and fluid hydration are recommended  Follow-up PCP  Return or be seen in ER with any progressing worsening symptoms  Patient understands and is agreeable with this plan  Follow up with PCP in 3-5 days  Proceed to  ER if symptoms worsen  Chief Complaint     Chief Complaint   Patient presents with    COVID-19 Exposure     Pt reports of headache with URI s/s   Pt also reports of exposure to positive members  History of Present Illness       Patient is a 52-year-old female presenting today with cold symptoms x2 days  Patient notes she had a close exposure to to individuals who recently tested positive for COVID-19, notes over the last couple days she has been experiencing some nasal congestion, headaches and ear pain, has been taking over-the-counter allergy medication and ibuprofen which is providing some resolution of her symptoms  Denies fever, chills, blurred vision, lightheadedness, dizziness, SOB  Review of Systems   Review of Systems   Constitutional: Negative for chills, fatigue and fever  HENT: Positive for congestion and ear pain  Negative for postnasal drip and sore throat  Eyes: Negative for redness and itching  Respiratory: Negative for cough, chest tightness and shortness of breath  Cardiovascular: Negative for chest pain  Gastrointestinal: Negative for diarrhea, nausea and vomiting     Musculoskeletal: Negative for arthralgias and myalgias  Neurological: Positive for headaches  Negative for light-headedness           Current Medications       Current Outpatient Medications:     albuterol (PROAIR HFA) 90 mcg/act inhaler, Inhale 2 puffs every 6 (six) hours as needed for wheezing (coughing  and  wheezing), Disp: 1 Inhaler, Rfl: 3    albuterol (ProAir HFA) 90 mcg/act inhaler, Inhale 2 puffs 4 (four) times a day, Disp: 1 g, Rfl: 1    beclomethasone (Qvar RediHaler) 40 MCG/ACT inhaler, Inhale 2 puffs 2 (two) times a day, Disp: 1 g, Rfl: 1    clindamycin (CLEOCIN T) 1 %, , Disp: , Rfl: 0    drospirenone-ethinyl estradiol (MARINO) 3-0 02 MG per tablet, Take 1 tablet by mouth daily, Disp: , Rfl:     EPINEPHrine (EPIPEN) 0 3 mg/0 3 mL SOAJ, Inject 0 3 mL (0 3 mg total) into a muscle once for 1 dose, Disp: 0 6 mL, Rfl: 2    ketoconazole (NIZORAL) 2 % cream, Apply topically daily for 14 days, Disp: 30 g, Rfl: 1    QVAR REDIHALER 80 MCG/ACT inhaler, Inhale 1 puff 2 (two) times a day Rinse mouth after use (Patient not taking: Reported on 11/12/2021 ), Disp: 1 Inhaler, Rfl: 3    SUMAtriptan (Imitrex) 25 mg tablet, Take 1 tablet (25 mg total) by mouth once as needed for migraine for up to 1 dose May repeat in 2 hours if no change in the headache, Disp: 30 tablet, Rfl: 1    Current Allergies     Allergies as of 06/24/2022 - Reviewed 06/24/2022   Allergen Reaction Noted    Bee venom  05/30/2016            The following portions of the patient's history were reviewed and updated as appropriate: allergies, current medications, past family history, past medical history, past social history, past surgical history and problem list      Past Medical History:   Diagnosis Date    Allergy to insects     last assessed 8/12/13, resolved 6/26/15    Exercise-induced asthma 1/10/2017    Fatigue 11/7/2019    Hematuria     last assessed 8/9/13, resolved 7/21/14    Hives 12/10/2015    Tinea versicolor 7/29/2021       Past Surgical History:   Procedure Laterality Date    NO PAST SURGERIES      WISDOM TOOTH EXTRACTION         Family History   Problem Relation Age of Onset    No Known Problems Mother     No Known Problems Father     Colon cancer Family     Heart disease Family          Medications have been verified  Objective   /54   Pulse 59   Temp 98 2 °F (36 8 °C)   Resp 20   Ht 5' 6" (1 676 m)   Wt 66 2 kg (146 lb)   SpO2 99%   BMI 23 57 kg/m²        Physical Exam     Physical Exam  Vitals and nursing note reviewed  Constitutional:       General: She is not in acute distress  Appearance: Normal appearance  She is not toxic-appearing  HENT:      Head: Normocephalic and atraumatic  Right Ear: Tympanic membrane, ear canal and external ear normal       Left Ear: Tympanic membrane, ear canal and external ear normal       Nose: Congestion present  Mouth/Throat:      Mouth: Mucous membranes are moist       Pharynx: Oropharynx is clear  No oropharyngeal exudate or posterior oropharyngeal erythema  Eyes:      Conjunctiva/sclera: Conjunctivae normal    Cardiovascular:      Rate and Rhythm: Normal rate and regular rhythm  Pulses: Normal pulses  Heart sounds: Normal heart sounds  Pulmonary:      Effort: Pulmonary effort is normal       Breath sounds: Normal breath sounds  Lymphadenopathy:      Cervical: No cervical adenopathy  Skin:     General: Skin is warm  Capillary Refill: Capillary refill takes less than 2 seconds  Neurological:      General: No focal deficit present  Mental Status: She is alert and oriented to person, place, and time

## 2022-06-25 LAB
FLUAV RNA RESP QL NAA+PROBE: NEGATIVE
FLUBV RNA RESP QL NAA+PROBE: NEGATIVE
SARS-COV-2 RNA RESP QL NAA+PROBE: NEGATIVE

## 2022-07-09 LAB
ALBUMIN SERPL-MCNC: 4.4 G/DL (ref 3.9–5)
ALBUMIN/GLOB SERPL: 1.9 {RATIO} (ref 1.2–2.2)
ALP SERPL-CCNC: 82 IU/L (ref 42–106)
ALT SERPL-CCNC: 21 IU/L (ref 0–32)
AST SERPL-CCNC: 21 IU/L (ref 0–40)
BASOPHILS # BLD AUTO: 0.1 X10E3/UL (ref 0–0.2)
BASOPHILS NFR BLD AUTO: 1 %
BILIRUB SERPL-MCNC: 0.8 MG/DL (ref 0–1.2)
BUN SERPL-MCNC: 11 MG/DL (ref 6–20)
BUN/CREAT SERPL: 15 (ref 9–23)
CALCIUM SERPL-MCNC: 9.5 MG/DL (ref 8.7–10.2)
CHLORIDE SERPL-SCNC: 104 MMOL/L (ref 96–106)
CHOLEST SERPL-MCNC: 192 MG/DL (ref 100–199)
CHOLEST/HDLC SERPL: 3 RATIO (ref 0–4.4)
CO2 SERPL-SCNC: 24 MMOL/L (ref 20–29)
CREAT SERPL-MCNC: 0.75 MG/DL (ref 0.57–1)
EOSINOPHIL # BLD AUTO: 0.1 X10E3/UL (ref 0–0.4)
EOSINOPHIL NFR BLD AUTO: 1 %
ERYTHROCYTE [DISTWIDTH] IN BLOOD BY AUTOMATED COUNT: 13.2 % (ref 11.7–15.4)
GLOBULIN SER-MCNC: 2.3 G/DL (ref 1.5–4.5)
GLUCOSE SERPL-MCNC: 82 MG/DL (ref 65–99)
HCT VFR BLD AUTO: 39.2 % (ref 34–46.6)
HDLC SERPL-MCNC: 65 MG/DL
HGB BLD-MCNC: 12.8 G/DL (ref 11.1–15.9)
IMM GRANULOCYTES # BLD: 0 X10E3/UL (ref 0–0.1)
IMM GRANULOCYTES NFR BLD: 0 %
LDLC SERPL CALC-MCNC: 116 MG/DL (ref 0–99)
LYMPHOCYTES # BLD AUTO: 2.5 X10E3/UL (ref 0.7–3.1)
LYMPHOCYTES NFR BLD AUTO: 43 %
MCH RBC QN AUTO: 29.9 PG (ref 26.6–33)
MCHC RBC AUTO-ENTMCNC: 32.7 G/DL (ref 31.5–35.7)
MCV RBC AUTO: 92 FL (ref 79–97)
MONOCYTES # BLD AUTO: 0.5 X10E3/UL (ref 0.1–0.9)
MONOCYTES NFR BLD AUTO: 9 %
NEUTROPHILS # BLD AUTO: 2.6 X10E3/UL (ref 1.4–7)
NEUTROPHILS NFR BLD AUTO: 46 %
PLATELET # BLD AUTO: 266 X10E3/UL (ref 150–450)
POTASSIUM SERPL-SCNC: 4.6 MMOL/L (ref 3.5–5.2)
PROT SERPL-MCNC: 6.7 G/DL (ref 6–8.5)
RBC # BLD AUTO: 4.28 X10E6/UL (ref 3.77–5.28)
SL AMB VLDL CHOLESTEROL CALC: 11 MG/DL (ref 5–40)
SODIUM SERPL-SCNC: 139 MMOL/L (ref 134–144)
TRIGL SERPL-MCNC: 56 MG/DL (ref 0–149)
WBC # BLD AUTO: 5.7 X10E3/UL (ref 3.4–10.8)

## 2022-07-15 ENCOUNTER — OFFICE VISIT (OUTPATIENT)
Dept: CARDIOLOGY CLINIC | Facility: CLINIC | Age: 22
End: 2022-07-15
Payer: COMMERCIAL

## 2022-07-15 VITALS
HEART RATE: 76 BPM | SYSTOLIC BLOOD PRESSURE: 102 MMHG | WEIGHT: 147.4 LBS | HEIGHT: 66 IN | OXYGEN SATURATION: 97 % | DIASTOLIC BLOOD PRESSURE: 70 MMHG | TEMPERATURE: 98 F | BODY MASS INDEX: 23.69 KG/M2

## 2022-07-15 DIAGNOSIS — E78.00 HIGH CHOLESTEROL: Primary | ICD-10-CM

## 2022-07-15 PROCEDURE — 93000 ELECTROCARDIOGRAM COMPLETE: CPT | Performed by: INTERNAL MEDICINE

## 2022-07-15 PROCEDURE — 99213 OFFICE O/P EST LOW 20 MIN: CPT | Performed by: INTERNAL MEDICINE

## 2022-07-15 NOTE — PROGRESS NOTES
Subjective: Yuriy Monday is here for follow up of dyslipidemia  Compliance with diet  has been poor  The patient exercises intermittently  She denies any complaints at this time  She denies any chest pain, shortness of breath, lower extremity edema, orthopnea or paroxysmal nocturnal dyspnea  lipid panel was done on July 22, 2021 which showed LDL of 116 and triglycerides of 56  LDL in December 2020 was 151 and triglycerides were 70  She has family history of early coronary artery disease and dyslipidemia  Likely has familial hypercholesterolemia  The following portions of the patient's history were reviewed and updated as appropriate: allergies, current medications, past family history, past medical history, past social history, past surgical history and problem list     Review of Systems   Respiratory: Negative for cough and shortness of breath  Cardiovascular: Negative for chest pain, palpitations and leg swelling  All other systems reviewed and are negative  Objective:   /70 (BP Location: Right arm, Patient Position: Sitting, Cuff Size: Standard)   Pulse 76   Temp 98 °F (36 7 °C)   Ht 5' 6" (1 676 m)   Wt 66 9 kg (147 lb 6 4 oz)   SpO2 97%   BMI 23 79 kg/m²   Physical Exam   Constitutional: She appears healthy  No distress  Eyes: Pupils are equal, round, and reactive to light  Conjunctivae are normal    Musculoskeletal:         General: No deformity or edema  Neurological: She is alert and oriented to person, place, and time  Lab Review  Lab Results   Component Value Date    TRIG 56 07/08/2022    TRIG 79 07/22/2021    TRIG 70 12/22/2020    HDL 65 07/08/2022    HDL 78 07/22/2021    HDL 68 12/22/2020         Assessment:      Dyslipidemia under satisfactory control  Plan:      1  LDL has significantly improved from previous  Likely cause was oral contraception pills along with a component of familial hypercholesterolemia    No one in family has had genetic testing in the past   2  Continue regular exercise  3  Lipid-lowering medications: None indicated unless lipids or risk profile worsen in the future       4   Follow up as needed  I have spent 20 minutes with Patient and family today in which greater than 50% of this time was spent in counseling/coordination of care regarding Diagnostic results, Intructions for management, Importance of tx compliance, Risk factor reductions and Impressions

## 2022-07-16 LAB
T4 FREE SERPL DIALY-MCNC: 1.1 NG/DL
TSH SERPL-ACNC: 1.9 UU/ML

## 2022-07-17 NOTE — RESULT ENCOUNTER NOTE
Total cholesterol is normal   Good job! LDL cholesterol is still mildly elevated but is down from 1 year ago    The rest of the labs are normal

## 2022-07-29 ENCOUNTER — TELEPHONE (OUTPATIENT)
Dept: PEDIATRICS CLINIC | Age: 22
End: 2022-07-29

## 2022-08-01 ENCOUNTER — OFFICE VISIT (OUTPATIENT)
Dept: PEDIATRICS CLINIC | Age: 22
End: 2022-08-01
Payer: COMMERCIAL

## 2022-08-01 VITALS — WEIGHT: 148 LBS | TEMPERATURE: 98.7 F | BODY MASS INDEX: 23.89 KG/M2
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
## 2022-08-01 PROCEDURE — 99213 OFFICE O/P EST LOW 20 MIN: CPT | Performed by: PEDIATRICS

## 2022-08-01 RX ORDER — SPIRONOLACTONE 50 MG/1
50 TABLET, FILM COATED ORAL DAILY
COMMUNITY
Start: 2022-07-20

## 2022-08-01 NOTE — PROGRESS NOTES
Assessment/Plan:   REASSURED ABOUT  RASH   VERDESO RX  REFILLED   ADVISED  CLOTHES  AND REPELLANT'S  FOR PROTECTION     Diagnoses and all orders for this visit:    Bug bite, initial encounter  -     desonide (VERDESO) 0 05 % foam; Apply topically 2 (two) times a day    Other orders  -     spironolactone (ALDACTONE) 50 mg tablet; Take 50 mg by mouth daily          Subjective:     Patient ID: Belynda Gitelman is a 22 y o  female  HERE BECAUSE OF  BUG  BITES, HAD  DEVELOP;ED INTO  LARGE ITCHY RASH AND SOME RASH  AREA  HAD  TURNED INTO BRUISE LIKE RASHES  HAS H/O  BUG BITES IN THE PAST  , HAD USE  HC  CREAM , NEED REFILLS ON  PREVIOUSLY PRESCRIBED  STEROID FOAM THAT IS MORE  EFFECTIVE       Review of Systems   Constitutional: Negative for fever  HENT: Negative for congestion and rhinorrhea  Respiratory: Negative for cough  Skin: Positive for rash (ITCHY , SOME HAS THE APPEARANCE  OF  A BRUISE)  Objective:     Physical Exam  Vitals reviewed  Constitutional:       General: She is not in acute distress  Appearance: Normal appearance  She is well-developed  HENT:      Right Ear: Tympanic membrane, ear canal and external ear normal       Left Ear: Tympanic membrane, ear canal and external ear normal       Nose: Nose normal       Mouth/Throat:      Mouth: Mucous membranes are moist       Pharynx: No posterior oropharyngeal erythema  Eyes:      General:         Right eye: No discharge  Left eye: No discharge  Extraocular Movements: Extraocular movements intact  Conjunctiva/sclera: Conjunctivae normal    Neck:      Thyroid: No thyromegaly  Cardiovascular:      Rate and Rhythm: Normal rate and regular rhythm  Heart sounds: Normal heart sounds  No murmur heard  Pulmonary:      Effort: Pulmonary effort is normal  No respiratory distress  Breath sounds: Normal breath sounds  No wheezing or rales  Abdominal:      Palpations: Abdomen is soft  There is no mass  Tenderness: There is no abdominal tenderness  Musculoskeletal:         General: No tenderness  Normal range of motion  Cervical back: Normal range of motion and neck supple  Lymphadenopathy:      Cervical: No cervical adenopathy  Skin:     General: Skin is warm  Findings: Rash (MULTIPLE  AREAS  OF  RASH  ON  EXPOSED  AREAS OF LEGS ,  SOME RASH  AREAS  ARE  DARKER  BUT  DO NOT  APPEAR TO BE  BRUISES, SOME EASH IS RED  AND  HAS LOCAL  SWELLING , THERE  ARE  A CENTER  DOT ON RASH SUGGESTING  A BUG  BITE  AS  THE  CAUSE ) present  Neurological:      General: No focal deficit present  Mental Status: She is alert     Psychiatric:         Mood and Affect: Mood normal          Behavior: Behavior normal

## 2022-08-15 ENCOUNTER — OFFICE VISIT (OUTPATIENT)
Dept: PEDIATRICS CLINIC | Age: 22
End: 2022-08-15
Payer: COMMERCIAL

## 2022-08-15 ENCOUNTER — OFFICE VISIT (OUTPATIENT)
Dept: OBGYN CLINIC | Facility: CLINIC | Age: 22
End: 2022-08-15
Payer: COMMERCIAL

## 2022-08-15 VITALS
HEIGHT: 67 IN | TEMPERATURE: 97.5 F | RESPIRATION RATE: 16 BRPM | DIASTOLIC BLOOD PRESSURE: 72 MMHG | BODY MASS INDEX: 22.91 KG/M2 | SYSTOLIC BLOOD PRESSURE: 112 MMHG | HEART RATE: 72 BPM | WEIGHT: 146 LBS

## 2022-08-15 VITALS — DIASTOLIC BLOOD PRESSURE: 68 MMHG | SYSTOLIC BLOOD PRESSURE: 120 MMHG | BODY MASS INDEX: 21.14 KG/M2 | WEIGHT: 131 LBS

## 2022-08-15 DIAGNOSIS — Z11.3 SCREEN FOR STD (SEXUALLY TRANSMITTED DISEASE): ICD-10-CM

## 2022-08-15 DIAGNOSIS — B36.0 TINEA VERSICOLOR: ICD-10-CM

## 2022-08-15 DIAGNOSIS — Z00.00 WELL ADULT EXAM: Primary | ICD-10-CM

## 2022-08-15 DIAGNOSIS — Z71.3 DIETARY COUNSELING: ICD-10-CM

## 2022-08-15 DIAGNOSIS — Z13.31 NEGATIVE DEPRESSION SCREENING: ICD-10-CM

## 2022-08-15 DIAGNOSIS — Z71.82 EXERCISE COUNSELING: ICD-10-CM

## 2022-08-15 DIAGNOSIS — Z01.419 ENCNTR FOR GYN EXAM (GENERAL) (ROUTINE) W/O ABN FINDINGS: Primary | ICD-10-CM

## 2022-08-15 DIAGNOSIS — L50.9 HIVES: ICD-10-CM

## 2022-08-15 DIAGNOSIS — D24.2 BREAST FIBROADENOMA IN FEMALE, LEFT: ICD-10-CM

## 2022-08-15 PROBLEM — M54.2 NECK PAIN: Status: RESOLVED | Noted: 2022-01-05 | Resolved: 2022-08-15

## 2022-08-15 PROCEDURE — 0503F POSTPARTUM CARE VISIT: CPT | Performed by: NURSE PRACTITIONER

## 2022-08-15 PROCEDURE — 99395 PREV VISIT EST AGE 18-39: CPT | Performed by: PEDIATRICS

## 2022-08-15 PROCEDURE — 99173 VISUAL ACUITY SCREEN: CPT | Performed by: PEDIATRICS

## 2022-08-15 PROCEDURE — 99385 PREV VISIT NEW AGE 18-39: CPT | Performed by: NURSE PRACTITIONER

## 2022-08-15 RX ORDER — TRETINOIN AND BENZOYL PEROXIDE 30; 1 MG/G; MG/G
CREAM TOPICAL
COMMUNITY
Start: 2022-07-21

## 2022-08-15 RX ORDER — KETOCONAZOLE 20 MG/G
CREAM TOPICAL 2 TIMES DAILY
Qty: 30 G | Refills: 1 | Status: SHIPPED | OUTPATIENT
Start: 2022-08-15 | End: 2022-10-10 | Stop reason: ALTCHOICE

## 2022-08-15 RX ORDER — EPINEPHRINE 0.3 MG/.3ML
0.3 INJECTION SUBCUTANEOUS ONCE
Qty: 0.6 ML | Refills: 2 | Status: SHIPPED | OUTPATIENT
Start: 2022-08-15 | End: 2022-08-15

## 2022-08-15 NOTE — PROGRESS NOTES
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, left  -     US breast left limited (diagnostic); Future    Screen for STD (sexually transmitted disease)  -     Chlamydia/GC amplified DNA by PCR      Discussion    Reviewed with patient normal exam today  Reviewed monthly SBEs  Rx for left breast ultrasound to follow up on fibroadenoma for 2 year stability  Encourage safe sexual practices; STD testing done today  Contraception - condoms   Pap smears will start at age 25 per the ASCCP guidelines  All questions have been answered to her satisfaction  RTO for annul or sooner if needed    Subjective     Watson Borja is a 22 y o  female new patient who presents for annual well woman exam    Last exam Never had one Pap Deferred  Pap guidelines reviewed with patient  Pap deferred today  Pt denies any abnormal vaginal discharge, itching, or odor  Pt not currently sexually active, has had two previous sexual partners, and would like STD testing today  Menstrual Cycle:  LMP: 7/31/2022  Period Cycle (Days): 29  Period Duration (Days): 4-5  Period Pattern: Regular  Menstrual Flow: Light  Menstrual Control: Maxi pad, Tampon  Dysmenorrhea: (!) Moderate  Dysmenorrhea Symptoms: Cramping  OB History    G 0   Contraception: None, declines at this time  Was previously on OCP for acne and had bad side effects from OCP  Practices monthly SBEs, no breast complaints today  Has hx of breast fibroadenoma  Denies any bowel or bladder issues  Pt follows with PCP for regular check-ups and blood work  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 [employer]        Review of Systems   Genitourinary: Negative          The following portions of the patient's history were reviewed and updated as appropriate: allergies, current medications, past family history, past medical history, past social history, past surgical history and problem list       Past Medical History:   Diagnosis Date    Allergy to insects     last assessed 8/12/13, resolved 6/26/15    Exercise-induced asthma 1/10/2017    Fatigue 11/7/2019    Hematuria     last assessed 8/9/13, resolved 7/21/14    Hives 12/10/2015    Tinea versicolor 7/29/2021       Past Surgical History:   Procedure Laterality Date    NO PAST SURGERIES      WISDOM TOOTH EXTRACTION         Family History   Problem Relation Age of Onset    No Known Problems Mother     No Known Problems Father     Colon cancer Family     Heart disease Family        Social History     Socioeconomic History    Marital status: Single     Spouse name: Not on file    Number of children: Not on file    Years of education: Not on file    Highest education level: Not on file   Occupational History    Not on file   Tobacco Use    Smoking status: Never Smoker    Smokeless tobacco: Never Used   Vaping Use    Vaping Use: Never used   Substance and Sexual Activity    Alcohol use: No    Drug use: Never    Sexual activity: Not Currently     Partners: Male     Birth control/protection: OCP, Condom Male   Other Topics Concern    Not on file   Social History Narrative     College in the Fall 2021    Pet's in caregivers home (1 cat)     Social Determinants of Health     Financial Resource Strain: Not on file   Food Insecurity: Not on file   Transportation Needs: Not on file   Physical Activity: Not on file   Stress: Not on file   Social Connections: Not on file   Intimate Partner Violence: Not on file   Housing Stability: Not on file         Current Outpatient Medications:     albuterol (ProAir HFA) 90 mcg/act inhaler, Inhale 2 puffs 4 (four) times a day, Disp: 1 g, Rfl: 1    beclomethasone (Qvar RediHaler) 40 MCG/ACT inhaler, Inhale 2 puffs 2 (two) times a day, Disp: 1 g, Rfl: 1    clindamycin (CLEOCIN T) 1 %, , Disp: , Rfl: 0    desonide (VERDESO) 0 05 % foam, Apply topically 2 (two) times a day, Disp: 100 g, Rfl: 1    spironolactone (ALDACTONE) 50 mg tablet, Take 50 mg by mouth daily, Disp: , Rfl:     EPINEPHrine (EPIPEN) 0 3 mg/0 3 mL SOAJ, Inject 0 3 mL (0 3 mg total) into a muscle once for 1 dose, Disp: 0 6 mL, Rfl: 2    ketoconazole (NIZORAL) 2 % cream, Apply topically daily for 14 days, Disp: 30 g, Rfl: 1    QVAR REDIHALER 80 MCG/ACT inhaler, Inhale 1 puff 2 (two) times a day Rinse mouth after use (Patient not taking: No sig reported), Disp: 1 Inhaler, Rfl: 3    SUMAtriptan (Imitrex) 25 mg tablet, Take 1 tablet (25 mg total) by mouth once as needed for migraine for up to 1 dose May repeat in 2 hours if no change in the headache (Patient not taking: No sig reported), Disp: 30 tablet, Rfl: 1    Allergies   Allergen Reactions    Bee Venom        Objective   Vitals:    08/15/22 0943   BP: 120/68   BP Location: Right arm   Patient Position: Sitting   Cuff Size: Standard   Weight: 59 4 kg (131 lb)     Physical Exam  Vitals and nursing note reviewed  Constitutional:       Appearance: She is well-developed  HENT:      Head: Normocephalic  Neck:      Thyroid: No thyromegaly  Trachea: No tracheal deviation  Cardiovascular:      Rate and Rhythm: Normal rate and regular rhythm  Heart sounds: Normal heart sounds  Pulmonary:      Effort: Pulmonary effort is normal       Breath sounds: Normal breath sounds  Chest:   Breasts: Breasts are symmetrical       Right: No inverted nipple, mass, nipple discharge, skin change or tenderness  Left: No inverted nipple, mass, nipple discharge, skin change or tenderness  Abdominal:      General: Bowel sounds are normal  There is no distension  Palpations: Abdomen is soft  There is no mass  Tenderness: There is no abdominal tenderness  There is no guarding or rebound  Genitourinary:     Labia:         Right: No rash, tenderness, lesion or injury  Left: No rash, tenderness, lesion or injury         Vagina: Normal       Cervix: Normal  Uterus: Normal        Adnexa: Right adnexa normal and left adnexa normal         Right: No mass, tenderness or fullness  Left: No mass, tenderness or fullness  Musculoskeletal:         General: Normal range of motion  Cervical back: Normal range of motion and neck supple  Skin:     General: Skin is warm and dry  Neurological:      Mental Status: She is alert and oriented to person, place, and time  Psychiatric:         Behavior: Behavior normal          Thought Content:  Thought content normal          Judgment: Judgment normal

## 2022-08-15 NOTE — PROGRESS NOTES
Subjective: Tona Morris is a 22 y o  female who is brought in for this well child visit  History provided by: patient and mother    Current Issues:  Current concerns: Rash on the upper back  Has been present for over 1 year  It will not tan      regular periods, no issues    The following portions of the patient's history were reviewed and updated as appropriate:   She  has a past medical history of Allergy to insects, Exercise-induced asthma (1/10/2017), Fatigue (11/7/2019), Hematuria, Hives (12/10/2015), Neck pain (1/5/2022), and Tinea versicolor (7/29/2021)  She   Patient Active Problem List    Diagnosis Date Noted    Migraine with aura and without status migrainosus, not intractable 01/05/2022    Need for diphtheria-tetanus-pertussis (Tdap) vaccine 07/29/2021    Breast fibroadenoma in female, left 07/31/2020    Breast mass, left 07/28/2020    Negative depression screening 07/28/2020    BMI 20 0-20 9, adult 07/28/2020    Well adult exam 07/28/2020    Tonsil stone 01/07/2019    Exercise-induced asthma 01/10/2017    High cholesterol 04/08/2015     She  has a past surgical history that includes No past surgeries and [place] tooth extraction  Her family history includes Colon cancer in her family; Heart disease in her family; No Known Problems in her father and mother  She  reports that she has never smoked  She has never used smokeless tobacco  She reports that she does not drink alcohol and does not use drugs    Current Outpatient Medications   Medication Sig Dispense Refill    EPINEPHrine (EPIPEN) 0 3 mg/0 3 mL SOAJ Inject 0 3 mL (0 3 mg total) into a muscle once for 1 dose 0 6 mL 2    ketoconazole (NIZORAL) 2 % cream Apply topically 2 (two) times a day for 21 days 30 g 1    desonide (VERDESO) 0 05 % foam Apply topically 2 (two) times a day 100 g 1    spironolactone (ALDACTONE) 50 mg tablet Take 50 mg by mouth daily      SUMAtriptan (Imitrex) 25 mg tablet Take 1 tablet (25 mg total) by mouth once as needed for migraine for up to 1 dose May repeat in 2 hours if no change in the headache (Patient not taking: No sig reported) 30 tablet 1    Twyneo 0 1-3 % CREA apply A PEA SIZED AMOUNT to face every OTHER NIGHT AND INCREASE T   (REFER TO PRESCRIPTION NOTES)  No current facility-administered medications for this visit  Current Outpatient Medications on File Prior to Visit   Medication Sig    desonide (VERDESO) 0 05 % foam Apply topically 2 (two) times a day    spironolactone (ALDACTONE) 50 mg tablet Take 50 mg by mouth daily    SUMAtriptan (Imitrex) 25 mg tablet Take 1 tablet (25 mg total) by mouth once as needed for migraine for up to 1 dose May repeat in 2 hours if no change in the headache (Patient not taking: No sig reported)    Twyneo 0 1-3 % CREA apply A PEA SIZED AMOUNT to face every OTHER NIGHT AND INCREASE T   (REFER TO PRESCRIPTION NOTES)   [DISCONTINUED] albuterol (ProAir HFA) 90 mcg/act inhaler Inhale 2 puffs 4 (four) times a day    [DISCONTINUED] beclomethasone (Qvar RediHaler) 40 MCG/ACT inhaler Inhale 2 puffs 2 (two) times a day    [DISCONTINUED] clindamycin (CLEOCIN T) 1 %     [DISCONTINUED] EPINEPHrine (EPIPEN) 0 3 mg/0 3 mL SOAJ Inject 0 3 mL (0 3 mg total) into a muscle once for 1 dose    [DISCONTINUED] ketoconazole (NIZORAL) 2 % cream Apply topically daily for 14 days    [DISCONTINUED] QVAR REDIHALER 80 MCG/ACT inhaler Inhale 1 puff 2 (two) times a day Rinse mouth after use (Patient not taking: No sig reported)     No current facility-administered medications on file prior to visit  She is allergic to bee venom       Well Child Assessment:  Jen Hardy lives with her mother and sister  Interval problems do not include recent illness or recent injury  Nutrition  Types of intake include vegetables, meats, fruits, eggs and junk food ([place] milk)  Junk food includes fast food and desserts (limited)  Dental  The patient has a dental home   The patient brushes teeth regularly  The patient does not floss regularly  Last dental exam was less than 6 months ago  Elimination  Elimination problems do not include constipation, diarrhea or urinary symptoms  Behavioral  Disciplinary methods include praising good behavior  Sleep  Average sleep duration (hrs): 7-8  There are no sleep problems  Safety  There is no smoking in the home  Home has working smoke alarms? yes  Home has working carbon monoxide alarms? yes  There is no gun in home  School  Grade level in school: [employer] 3rd year  Child is doing well in school  Social  Sibling interactions are fair  Screen time per day: Under 2 hours  Review of Systems   Constitutional: Negative for chills and fever  HENT: Negative for ear pain and sore throat  Eyes: Negative for pain and visual disturbance  Respiratory: Negative for cough and shortness of breath  Cardiovascular: Negative for chest pain and palpitations  Gastrointestinal: Negative for abdominal pain, constipation, diarrhea and vomiting  Genitourinary: Negative for dysuria and hematuria  Musculoskeletal: Negative for arthralgias and back pain  Skin: Positive for color change and rash  Neurological: Negative for seizures and syncope  Psychiatric/Behavioral: Negative for sleep disturbance  All other systems reviewed and are negative  Objective:       Vitals:    08/15/22 1309   BP: 112/72   Pulse: 72   Resp: 16   Temp: 97 5 °F (36 4 °C)   Weight: 66 2 kg (146 lb)   Height: 5' 6 75" (1 695 m)     Growth parameters are noted and are appropriate for age  Wt Readings from Last 1 Encounters:   08/15/22 66 2 kg (146 lb)     Ht Readings from Last 1 Encounters:   08/15/22 5' 6 75" (1 695 m)      Body mass index is 23 04 kg/m²      Vitals:    08/15/22 1309   BP: 112/72   Pulse: 72   Resp: 16   Temp: 97 5 °F (36 4 °C)   Weight: 66 2 kg (146 lb)   Height: 5' 6 75" (1 695 m)        Hearing Screening    Method: Otoacoustic emissions    125Hz 250Hz 500Hz 1000Hz 2000Hz 3000Hz 4000Hz 6000Hz 8000Hz   Right ear:     15 15 15     Left ear:     15 15 15     Comments: Pass bilat  R 5000hz 15db  L 5000hz 15db     Visual Acuity Screening    Right eye Left eye Both eyes   Without correction: 20/20 20/20 20/20   With correction:          Physical Exam  Vitals and nursing note reviewed  Constitutional:       General: She is not in acute distress  Appearance: Normal appearance  She is well-developed and normal weight  She is not ill-appearing  HENT:      Head: Normocephalic and atraumatic  Right Ear: Tympanic membrane, ear canal and external ear normal       Left Ear: Tympanic membrane, ear canal and external ear normal       Nose: Nose normal       Mouth/Throat:      Mouth: Mucous membranes are moist       Pharynx: Oropharynx is clear  No oropharyngeal exudate or posterior oropharyngeal erythema  Eyes:      General:         Right eye: No discharge  Left eye: No discharge  Extraocular Movements: Extraocular movements intact  Conjunctiva/sclera: Conjunctivae normal       Pupils: Pupils are equal, round, and reactive to light  Comments: Fundi clear   Neck:      Thyroid: No thyromegaly  Cardiovascular:      Rate and Rhythm: Normal rate and regular rhythm  Heart sounds: Normal heart sounds  No murmur heard  Pulmonary:      Effort: Pulmonary effort is normal  No respiratory distress  Breath sounds: Normal breath sounds  No wheezing or rales  Abdominal:      General: Bowel sounds are normal  There is no distension  Palpations: Abdomen is soft  There is no mass  Tenderness: There is no abdominal tenderness  There is no right CVA tenderness, left CVA tenderness or guarding  Genitourinary:     Comments: Declined examination  Musculoskeletal:         General: Normal range of motion  Cervical back: Normal range of motion and neck supple        Comments: No scoliosis   Lymphadenopathy:      Cervical: No cervical adenopathy  Skin:     General: Skin is dry  Findings: Rash (hypopigmented macular lesions on the upper back) present  Neurological:      Mental Status: She is alert and oriented to person, place, and time  Cranial Nerves: No cranial nerve deficit  Motor: No abnormal muscle tone  Deep Tendon Reflexes: Reflexes are normal and symmetric  Reflexes normal    Psychiatric:         Mood and Affect: Mood normal          Behavior: Behavior normal          Thought Content: Thought content normal          Judgment: Judgment normal            Assessment:     Well adolescent  1  Well adult exam     2  Dietary counseling     3  Exercise counseling     4  BMI 23 0-23 9, adult     5  Negative depression screening     6  Tinea versicolor  ketoconazole (NIZORAL) 2 % cream   7  Hives  EPINEPHrine (EPIPEN) 0 3 mg/0 3 mL SOAJ        Plan:         1  Anticipatory guidance discussed  Specific topics reviewed: bicycle helmets, breast self-exam, drugs, ETOH, and tobacco, importance of regular dental care, importance of regular exercise, importance of varied diet, limit TV, media violence, minimize junk food, seat belts and sex; STD and pregnancy prevention  2  Development: appropriate for age    1  Immunizations today:none    4  Follow-up visit in 1 year for next well child visit, or sooner as needed

## 2022-08-17 LAB
C TRACH RRNA SPEC QL NAA+PROBE: NEGATIVE
N GONORRHOEA RRNA SPEC QL NAA+PROBE: NEGATIVE

## 2022-08-19 ENCOUNTER — HOSPITAL ENCOUNTER (OUTPATIENT)
Dept: RADIOLOGY | Facility: HOSPITAL | Age: 22
Discharge: HOME | End: 2022-08-19
Payer: COMMERCIAL

## 2022-08-19 DIAGNOSIS — D24.2 BREAST FIBROADENOMA IN FEMALE, LEFT: ICD-10-CM

## 2022-08-19 PROCEDURE — 76642 ULTRASOUND BREAST LIMITED: CPT

## 2022-10-10 ENCOUNTER — HOSPITAL ENCOUNTER (OUTPATIENT)
Dept: RADIOLOGY | Facility: HOSPITAL | Age: 22
Discharge: HOME | End: 2022-10-10
Attending: PEDIATRICS
Payer: COMMERCIAL

## 2022-10-10 ENCOUNTER — OFFICE VISIT (OUTPATIENT)
Dept: PEDIATRICS CLINIC | Age: 22
End: 2022-10-10
Payer: COMMERCIAL

## 2022-10-10 ENCOUNTER — TELEPHONE (OUTPATIENT)
Dept: OBGYN CLINIC | Facility: CLINIC | Age: 22
End: 2022-10-10

## 2022-10-10 VITALS
BODY MASS INDEX: 22.41 KG/M2 | DIASTOLIC BLOOD PRESSURE: 72 MMHG | SYSTOLIC BLOOD PRESSURE: 114 MMHG | WEIGHT: 142 LBS | TEMPERATURE: 97.4 F

## 2022-10-10 DIAGNOSIS — R10.31 RT GROIN PAIN: ICD-10-CM

## 2022-10-10 DIAGNOSIS — R10.31 RIGHT LOWER QUADRANT ABDOMINAL PAIN: ICD-10-CM

## 2022-10-10 DIAGNOSIS — R10.31 RT GROIN PAIN: Primary | ICD-10-CM

## 2022-10-10 PROBLEM — N83.201 CYST OF RIGHT OVARY: Status: ACTIVE | Noted: 2022-10-10

## 2022-10-10 LAB
SL AMB  POCT GLUCOSE, UA: NORMAL
SL AMB LEUKOCYTE ESTERASE,UA: NORMAL
SL AMB POCT BILIRUBIN,UA: NORMAL
SL AMB POCT BLOOD,UA: NORMAL
SL AMB POCT CLARITY,UA: CLEAR
SL AMB POCT COLOR,UA: YELLOW
SL AMB POCT KETONES,UA: NORMAL
SL AMB POCT NITRITE,UA: NORMAL
SL AMB POCT PH,UA: 5
SL AMB POCT SPECIFIC GRAVITY,UA: 1.02
SL AMB POCT URINE PROTEIN: NORMAL
SL AMB POCT UROBILINOGEN: NORMAL

## 2022-10-10 PROCEDURE — 76856 US EXAM PELVIC COMPLETE: CPT

## 2022-10-10 PROCEDURE — 99214 OFFICE O/P EST MOD 30 MIN: CPT | Performed by: PEDIATRICS

## 2022-10-10 PROCEDURE — 81002 URINALYSIS NONAUTO W/O SCOPE: CPT | Performed by: PEDIATRICS

## 2022-10-10 PROCEDURE — 76830 TRANSVAGINAL US NON-OB: CPT

## 2022-10-10 NOTE — RESULT ENCOUNTER NOTE
Ren Celestiner was referred to gynecology after the ultrasound results  She has a right ovarian cyst with a reducible clot  She needs a follow up ultrasound in 8-12 weeks

## 2022-10-10 NOTE — TELEPHONE ENCOUNTER
Patient has ultra sound at [facility]  They said she has a cyst and should follow up with our office

## 2022-10-10 NOTE — PROGRESS NOTES
Assessment/Plan:  I will get a pelvis ultrasound today  Follow up prn  Diagnoses and all orders for this visit:    Rt groin pain  -     US pelvis transabdominal only; Future    Right lower quadrant abdominal pain  -     POCT urine dip  -     US pelvis transabdominal only; Future          Subjective:      Patient ID: Sylvia Estrada is a 22 y o  female  Abdominal Pain  This is a new problem  The current episode started in the past 7 days  The onset quality is sudden  The problem occurs intermittently  The problem has been gradually improving since onset  The pain is located in the RLQ  The pain is at a severity of 8/10  The pain is severe  The quality of the pain is described as dull and sharp (and pressure)  The pain does not radiate  Pertinent negatives include no anorexia, belching, constipation, diarrhea, dysuria, flatus, frequency, hematuria, nausea or vomiting  Treatments tried: Advil  The treatment provided moderate relief  The following portions of the patient's history were reviewed and updated as appropriate:   She  has a past medical history of Allergy to insects, Exercise-induced asthma (1/10/2017), Fatigue (11/7/2019), Hematuria, Hives (12/10/2015), Neck pain (1/5/2022), and Tinea versicolor (7/29/2021)  She   Patient Active Problem List    Diagnosis Date Noted   • Migraine with aura and without status migrainosus, not intractable 01/05/2022   • Need for diphtheria-tetanus-pertussis (Tdap) vaccine 07/29/2021   • Breast fibroadenoma in female, left 07/31/2020   • Breast mass, left 07/28/2020   • Negative depression screening 07/28/2020   • BMI 20 0-20 9, adult 07/28/2020   • Well adult exam 07/28/2020   • Tonsil stone 01/07/2019   • Exercise-induced asthma 01/10/2017   • High cholesterol 04/08/2015     She  has a past surgical history that includes No past surgeries and [place] tooth extraction  Her family history includes Colon cancer in her family; Heart disease in her family;  No Known Problems in her father and mother  She  reports that she has never smoked  She has never used smokeless tobacco  She reports that she does not drink alcohol and does not use drugs  Current Outpatient Medications   Medication Sig Dispense Refill   • desonide (VERDESO) 0 05 % foam Apply topically 2 (two) times a day 100 g 1   • EPINEPHrine (EPIPEN) 0 3 mg/0 3 mL SOAJ Inject 0 3 mL (0 3 mg total) into a muscle once for 1 dose 0 6 mL 2   • spironolactone (ALDACTONE) 50 mg tablet Take 50 mg by mouth daily     • Twyneo 0 1-3 % CREA apply A PEA SIZED AMOUNT to face every OTHER NIGHT AND INCREASE T   (REFER TO PRESCRIPTION NOTES)  No current facility-administered medications for this visit  Current Outpatient Medications on File Prior to Visit   Medication Sig   • desonide (VERDESO) 0 05 % foam Apply topically 2 (two) times a day   • EPINEPHrine (EPIPEN) 0 3 mg/0 3 mL SOAJ Inject 0 3 mL (0 3 mg total) into a muscle once for 1 dose   • spironolactone (ALDACTONE) 50 mg tablet Take 50 mg by mouth daily   • Twyneo 0 1-3 % CREA apply A PEA SIZED AMOUNT to face every OTHER NIGHT AND INCREASE T   (REFER TO PRESCRIPTION NOTES)  • [DISCONTINUED] ketoconazole (NIZORAL) 2 % cream Apply topically 2 (two) times a day for 21 days   • [DISCONTINUED] SUMAtriptan (Imitrex) 25 mg tablet Take 1 tablet (25 mg total) by mouth once as needed for migraine for up to 1 dose May repeat in 2 hours if no change in the headache (Patient not taking: No sig reported)     No current facility-administered medications on file prior to visit  She is allergic to bee venom       Review of Systems   Gastrointestinal: Positive for abdominal pain  Negative for anorexia, constipation, diarrhea, flatus, nausea and vomiting  Genitourinary: Negative for dysuria, frequency and hematuria           Objective:      Results for orders placed or performed in visit on 10/10/22   POCT urine dip   Result Value Ref Range    LEUKOCYTE ESTERASE,UA neg NITRITE,UA neg     SL AMB POCT UROBILINOGEN norm     POCT URINE PROTEIN trace      PH,UA 5     BLOOD,UA neg     SPECIFIC GRAVITY,UA 1 020     KETONES,UA neg     BILIRUBIN,UA neg     GLUCOSE, UA norm      COLOR,UA yellow     CLARITY,UA clear      /72   Temp (!) 97 4 °F (36 3 °C)   Wt 64 4 kg (142 lb)   BMI 22 41 kg/m²          Physical Exam  Vitals reviewed  Constitutional:       General: She is not in acute distress  Appearance: She is well-developed  She is not ill-appearing  HENT:      Head: Normocephalic and atraumatic  Right Ear: External ear normal       Left Ear: External ear normal       Nose: Nose normal       Mouth/Throat:      Pharynx: No oropharyngeal exudate  Eyes:      General:         Right eye: No discharge  Left eye: No discharge  Conjunctiva/sclera: Conjunctivae normal       Pupils: Pupils are equal, round, and reactive to light  Cardiovascular:      Rate and Rhythm: Normal rate and regular rhythm  Heart sounds: Normal heart sounds  No murmur heard  Pulmonary:      Effort: Pulmonary effort is normal  No respiratory distress  Breath sounds: Normal breath sounds  No wheezing or rales  Abdominal:      General: Bowel sounds are normal  There is no distension  Palpations: Abdomen is soft  There is no mass  Tenderness: There is abdominal tenderness (right groin area)  There is no right CVA tenderness, left CVA tenderness, guarding or rebound  Hernia: No hernia is present  Musculoskeletal:      Cervical back: Neck supple  Lymphadenopathy:      Cervical: No cervical adenopathy  Skin:     General: Skin is warm  Neurological:      Mental Status: She is alert

## 2022-10-10 NOTE — LETTER
[address] [place] [address]  [address] [facility] [address] [facility]      October 14, 2022    MRN: [id number]     Phone: [phone]     Dear Ms  Salena Padilla recently had a(n) Ultrasound performed on 10/10/2022 at  [address] [place] [address] that was requested by Jean Carlos Pederson MD  The study was reviewed by a radiologist, which is a physician who specializes in medical imaging  The radiologist issued a report describing his or her findings  In that report there was a finding that the radiologist felt warranted further discussion with your health care provider and that discussion would be beneficial to you  The results were sent to Jean Carlos Pederson MD on 10/10/2022  2:44 PM  We recommend that you contact Jean Carlos Pederson MD at [phone] or set up an appointment to discuss the results of the imaging test  If you have already heard from Jean Carlos Pederson MD regarding the results of your study, you can disregard this letter  This letter is not meant to alarm you, but intended to encourage you to follow-up on your results with the provider that sent you for the imaging study  In addition, we have enclosed answers to frequently asked questions by other patients who have also received a letter to review results with their health care provider (see page two)  Thank you for choosing [facility] for your medical imaging needs  FREQUENTLY ASKED QUESTIONS    1  Why am I receiving this letter? [facility] requires us to notify patients who have findings on imaging exams that may require more testing or follow-up with a health professional within the next 3 months          2  How serious is the finding on the imaging test?  This letter is sent to all patients who may need follow-up or more testing within the next 3 months  Receiving this letter does not necessarily mean you have a life-threatening imaging finding or disease  Recommendations in the radiologist’s imaging report are general in nature and it is up to your healthcare provider to say whether those recommendations make sense for your situation  You are strongly encouraged to talk to your health care provider about the results and ask whether additional steps need to be taken  3  Where can I get a copy of the final report for my recent radiology exam?  To get a full copy of the report you can access your records online at [email or web address] or please contact [facility] Department at [phone] Monday through Friday between 8 am and 6 pm          4  What do I need to do now? Please contact your health care provider who requested the imaging study to discuss what further actions (if any) are needed  You may have already heard from (your ordering provider) in regard to this test in which case you can disregard this letter  NOTICE IN ACCORDANCE WITH THE [place] STATE “PATIENT TEST RESULT INFORMATION ACT OF 2018”    You are receiving this notice as a result of a determination by your diagnostic imaging service that further discussions of your test results are warranted and would be beneficial to you  The complete results of your test or tests have been or will be sent to the health care practitioner that ordered the test or tests  It is recommended that you contact your health care practitioner to discuss your results as soon as possible

## 2022-10-11 DIAGNOSIS — N83.201 RIGHT OVARIAN CYST: Primary | ICD-10-CM

## 2022-10-11 DIAGNOSIS — Z09 FOLLOW-UP EXAM: ICD-10-CM

## 2022-10-11 NOTE — TELEPHONE ENCOUNTER
Reviewed provider recommendations with pt to complete f/u pelvic us 8-12 weeks, ordered and central scheduling number provided  Pt aware if increase in pain or any changes or concerns to call office for sooner f/u  Pt aware once ultrasound completed provider will review results and recommend appt to discuss prevention of ovarian cysts  pt verbalizes understanding and has no questions at this time

## 2022-10-12 PROBLEM — Z00.00 WELL ADULT EXAM: Status: RESOLVED | Noted: 2020-07-28 | Resolved: 2022-10-12

## 2022-12-17 ENCOUNTER — HOSPITAL ENCOUNTER (OUTPATIENT)
Dept: RADIOLOGY | Facility: HOSPITAL | Age: 22
Discharge: HOME | End: 2022-12-17
Attending: OBSTETRICS & GYNECOLOGY

## 2022-12-17 DIAGNOSIS — N83.201 RIGHT OVARIAN CYST: ICD-10-CM

## 2022-12-17 DIAGNOSIS — Z09 FOLLOW-UP EXAM: ICD-10-CM

## 2023-02-06 ENCOUNTER — TELEPHONE (OUTPATIENT)
Age: 23
End: 2023-02-06

## 2023-02-20 NOTE — TELEPHONE ENCOUNTER
02/19/23 8:38 PM     The office's request has been received, reviewed, and the patient chart updated  The PCP has successfully been removed with a patient attribution note  This message will now be completed      Thank you  Tung Arango

## 2024-02-02 ENCOUNTER — TELEPHONE (OUTPATIENT)
Dept: OBGYN CLINIC | Facility: CLINIC | Age: 24
End: 2024-02-02

## 2024-02-02 DIAGNOSIS — N83.201 RIGHT OVARIAN CYST: Primary | ICD-10-CM

## 2024-02-02 DIAGNOSIS — R10.2 PELVIC PAIN: ICD-10-CM

## 2024-02-02 NOTE — TELEPHONE ENCOUNTER
Pt was advised that if pain becomes more severe or starts to develop nausea, vomiting, diarrhea, or fevers then she would need to go to the ER for further evaluation.

## 2024-02-02 NOTE — TELEPHONE ENCOUNTER
Pt called and states that she started to experience pain on her right side and concerned that her cyst may have come back.  She states the pain is dull and at times will get sharp at times, more so when her bladder is full.  She does have some occasional pressure when she needs to use the bathroom.  She is due for her annual exam and advised pt in the meantime can order a pelvic ultrasound and urine culture.  Pt agreeable.

## 2024-02-03 ENCOUNTER — HOSPITAL ENCOUNTER (OUTPATIENT)
Dept: RADIOLOGY | Facility: HOSPITAL | Age: 24
Discharge: HOME | End: 2024-02-03
Attending: STUDENT IN AN ORGANIZED HEALTH CARE EDUCATION/TRAINING PROGRAM
Payer: COMMERCIAL

## 2024-02-03 DIAGNOSIS — R10.2 PELVIC PAIN: ICD-10-CM

## 2024-02-03 DIAGNOSIS — N83.201 RIGHT OVARIAN CYST: ICD-10-CM

## 2024-02-03 PROCEDURE — 76856 US EXAM PELVIC COMPLETE: CPT

## 2024-02-03 PROCEDURE — 76830 TRANSVAGINAL US NON-OB: CPT

## 2024-02-05 DIAGNOSIS — N83.201 RIGHT OVARIAN CYST: Primary | ICD-10-CM

## 2024-02-05 LAB
BACTERIA UR CULT: NORMAL
Lab: NO GROWTH

## 2024-05-21 ENCOUNTER — HOSPITAL ENCOUNTER (OUTPATIENT)
Dept: RADIOLOGY | Facility: HOSPITAL | Age: 24
Discharge: HOME | End: 2024-05-21
Attending: STUDENT IN AN ORGANIZED HEALTH CARE EDUCATION/TRAINING PROGRAM
Payer: COMMERCIAL

## 2024-05-21 DIAGNOSIS — N83.201 RIGHT OVARIAN CYST: ICD-10-CM

## 2024-05-21 PROCEDURE — 76830 TRANSVAGINAL US NON-OB: CPT

## 2024-05-21 PROCEDURE — 76856 US EXAM PELVIC COMPLETE: CPT

## 2024-05-31 ENCOUNTER — TELEPHONE (OUTPATIENT)
Age: 24
End: 2024-05-31

## 2024-05-31 NOTE — TELEPHONE ENCOUNTER
Pt called to inquire about US results form 5/21. Communicated results have not yet been interpreted by MD, and that clinical team will reach out once results are available.

## 2024-06-04 NOTE — TELEPHONE ENCOUNTER
Pt missed call on 06/04 called back I read results as noted by Dr. Paulson and pt will follow up  with  On 07/01

## 2024-07-01 ENCOUNTER — ANNUAL EXAM (OUTPATIENT)
Dept: OBGYN CLINIC | Facility: CLINIC | Age: 24
End: 2024-07-01
Payer: COMMERCIAL

## 2024-07-01 VITALS — WEIGHT: 143 LBS | HEIGHT: 67 IN | BODY MASS INDEX: 22.44 KG/M2

## 2024-07-01 DIAGNOSIS — Z01.419 WOMEN'S ANNUAL ROUTINE GYNECOLOGICAL EXAMINATION: Primary | ICD-10-CM

## 2024-07-01 PROCEDURE — 99395 PREV VISIT EST AGE 18-39: CPT | Performed by: NURSE PRACTITIONER

## 2024-07-01 PROCEDURE — G0145 SCR C/V CYTO,THINLAYER,RESCR: HCPCS | Performed by: NURSE PRACTITIONER

## 2024-07-01 NOTE — PROGRESS NOTES
Subjective    HPI:     Liliana Crawford is a 23 y.o. nulliparous female. Her menstrual cycles are regular 28-30 days apart, lasting 5-6 days. Her current method of contraception includes none. She is not currently sexually active. She denies /GI and Gyn complaints. She feels safe at home. She  has situational   anxiety. Medical, surgical and family history reviewed. Her dental care is up-to-date. She eats a healthy diet and exercises regularly. She is happy with her weight.     Visit Vitals  Wt 64.9 kg (143 lb)   LMP 2024   BMI 22.57 kg/m²   OB Status Having periods   Smoking Status Never   BSA 1.75 m²       Gynecologic History    Patient's last menstrual period was 2024.  Gardasil Vaccine Series: completed in     Obstetric and Medical History    OB History    Para Term  AB Living   0 0 0 0 0 0   SAB IAB Ectopic Multiple Live Births   0 0 0 0 0       Past Medical History:   Diagnosis Date    Allergy to insects     last assessed 13, resolved 6/26/15    Exercise-induced asthma 1/10/2017    Fatigue 2019    Hematuria     last assessed 13, resolved 14    Hives 12/10/2015    Neck pain 2022    Tinea versicolor 2021       Past Surgical History:   Procedure Laterality Date    NO PAST SURGERIES      WISDOM TOOTH EXTRACTION         The following portions of the patient's history were reviewed and updated as appropriate: allergies, current medications, past family history, past medical history, past social history, past surgical history, and problem list.    Review of Systems    Pertinent items are noted in HPI.      Objective    Physical Exam  Constitutional:       Appearance: Normal appearance. She is well-developed.   Genitourinary:      Vulva, bladder and urethral meatus normal.      No lesions in the vagina.      Right Labia: No rash, tenderness, lesions, skin changes or Bartholin's cyst.     Left Labia: No tenderness, lesions, skin changes, Bartholin's cyst or  rash.     No labial fusion noted.      No inguinal adenopathy present in the right or left side.     No vaginal discharge, erythema, tenderness, bleeding or granulation tissue.      No vaginal prolapse present.     No vaginal atrophy present.       Right Adnexa: not tender, not full and no mass present.     Left Adnexa: not tender, not full and no mass present.     Cervix is nulliparous.      No cervical motion tenderness, discharge, friability, lesion, polyp or nabothian cyst.      Uterus is not enlarged, tender, irregular or prolapsed.      No uterine mass detected.     Uterus is anteverted.      Pelvic exam was performed with patient in the lithotomy position.   Breasts:     Breasts are symmetrical.      Right: No inverted nipple, mass, nipple discharge, skin change or tenderness.      Left: No inverted nipple, mass, nipple discharge, skin change or tenderness.   HENT:      Head: Normocephalic and atraumatic.   Neck:      Thyroid: No thyromegaly.   Cardiovascular:      Rate and Rhythm: Normal rate and regular rhythm.      Heart sounds: Normal heart sounds, S1 normal and S2 normal.   Pulmonary:      Effort: Pulmonary effort is normal.      Breath sounds: Normal breath sounds.   Abdominal:      General: Bowel sounds are normal. There is no distension.      Palpations: Abdomen is soft. There is no mass.      Tenderness: There is no abdominal tenderness. There is no guarding.      Hernia: There is no hernia in the left inguinal area or right inguinal area.   Musculoskeletal:      Cervical back: Neck supple.   Lymphadenopathy:      Cervical: No cervical adenopathy.      Upper Body:      Right upper body: No supraclavicular or axillary adenopathy.      Left upper body: No supraclavicular or axillary adenopathy.      Lower Body: No right inguinal adenopathy. No left inguinal adenopathy.   Neurological:      Mental Status: She is alert.   Skin:     General: Skin is warm and dry.      Findings: No rash.   Psychiatric:          Attention and Perception: Attention and perception normal.         Mood and Affect: Mood and affect normal.         Speech: Speech normal.         Behavior: Behavior is cooperative.         Thought Content: Thought content normal.         Cognition and Memory: Cognition and memory normal.         Judgment: Judgment normal.   Vitals and nursing note reviewed.          Assessment and Plan    Liliana was seen today for gynecologic exam.    Diagnoses and all orders for this visit:    Women's annual routine gynecological examination  -     Liquid-based pap, screening      Patient informed of a Stable GYN exam. A pap smear was performed.     I have discussed the importance of exercise and healthy diet as well as adequate intake of calcium and vitamin D. The current ASCCP guidelines were reviewed. The low risk patient will receive pap smear screening every 3 years until the age of 29 and then every 3 to 5 years with HPV co-testing from the ages of 30-65. I emphasized the importance of an annual pelvic and breast exam. A yearly mammogram is recommended for breast cancer screening starting at age 40.       Results will be released to [facility], if abnormal will call to review and discuss treatment plan.     All questions have been answered to her satisfaction.       Contraception: abstinence.  Follow up in: 1 year or sooner if needed.

## 2024-07-08 LAB
LAB AP GYN PRIMARY INTERPRETATION: NORMAL
Lab: NORMAL

## 2024-11-11 ENCOUNTER — OFFICE VISIT (OUTPATIENT)
Dept: FAMILY MEDICINE CLINIC | Facility: CLINIC | Age: 24
End: 2024-11-11
Payer: COMMERCIAL

## 2024-11-11 VITALS
TEMPERATURE: 97 F | HEIGHT: 67 IN | WEIGHT: 146 LBS | DIASTOLIC BLOOD PRESSURE: 80 MMHG | SYSTOLIC BLOOD PRESSURE: 114 MMHG | BODY MASS INDEX: 22.91 KG/M2 | RESPIRATION RATE: 16 BRPM | HEART RATE: 68 BPM

## 2024-11-11 DIAGNOSIS — Z11.8 SCREENING FOR CHLAMYDIAL DISEASE: ICD-10-CM

## 2024-11-11 DIAGNOSIS — F32.0 MILD MAJOR DEPRESSION (HCC): ICD-10-CM

## 2024-11-11 DIAGNOSIS — Z13.21 ENCOUNTER FOR VITAMIN DEFICIENCY SCREENING: ICD-10-CM

## 2024-11-11 DIAGNOSIS — E78.00 HIGH CHOLESTEROL: ICD-10-CM

## 2024-11-11 DIAGNOSIS — Z11.59 NEED FOR HEPATITIS C SCREENING TEST: ICD-10-CM

## 2024-11-11 DIAGNOSIS — Z00.00 WELL ADULT EXAM: Primary | ICD-10-CM

## 2024-11-11 DIAGNOSIS — Z13.29 SCREENING FOR THYROID DISORDER: ICD-10-CM

## 2024-11-11 DIAGNOSIS — Z13.0 SCREENING FOR DEFICIENCY ANEMIA: ICD-10-CM

## 2024-11-11 DIAGNOSIS — Z11.4 SCREENING FOR HIV (HUMAN IMMUNODEFICIENCY VIRUS): ICD-10-CM

## 2024-11-11 PROCEDURE — 99385 PREV VISIT NEW AGE 18-39: CPT | Performed by: FAMILY MEDICINE

## 2024-11-11 NOTE — PROGRESS NOTES
Adult Annual Physical  Name: Liliana Crawford      : 2000      MRN: [id number]  Encounter Provider: Abigail Bernal MD  Encounter Date: 2024   Encounter department: [facility]    Assessment & Plan  Well adult exam         Need for hepatitis C screening test    Orders:    Hepatitis C Antibody; Future    Hepatitis C Antibody    Screening for HIV (human immunodeficiency virus)    Orders:    HIV 1/2 Antigen/Antibody (Fourth Generation) with Reflex Testing ([facility], [facility], or EXTERNAL LAB); Future    High cholesterol    Orders:    Comprehensive metabolic panel; Future    Lipid Panel with Direct LDL reflex; Future    Comprehensive metabolic panel    Lipid Panel with Direct LDL reflex    Screening for deficiency anemia    Orders:    CBC; Future    CBC    Screening for thyroid disorder    Orders:    TSH, 3rd generation with Free T4 reflex; Future    TSH, 3rd generation with Free T4 reflex    Encounter for vitamin deficiency screening    Orders:    Vitamin B12; Future    Vitamin D 25 hydroxy; Future    Vitamin B12    Vitamin D 25 hydroxy    Screening for chlamydial disease    Orders:    Chlamydia/GC SHIVANI, Confirmation; Future    Chlamydia/GC SHIVANI, Confirmation    Mild major depression (HCC)  Depression Screening Follow-up Plan: Patient's depression screening was positive with a PHQ-2 score of 3. Their PHQ-9 score was 6. Patient assessed for underlying major depression. They have no active suicidal ideations. Brief counseling provided and recommend additional follow-up/re-evaluation next office visit.  She has been seeing a therapist for the past 10 years. Continue. She recently lost her job.        Immunizations and preventive care screenings were discussed with patient today. Appropriate education was printed on patient's after visit summary.         History of Present Illness     Adult Annual Physical:  Patient presents for annual physical.     Diet and Physical Activity:  - Diet/Nutrition: well  "balanced diet.  - Exercise: 3-4 times a week on average.    Depression Screening:  - PHQ-2 Score: 3  - PHQ-9 Score: 6    General Health:  - Sleep: sleeps well.  - Hearing: normal hearing bilateral ears.  - Vision: no vision problems.  - Dental: regular dental visits and brushes teeth twice daily.    /GYN Health:  - Follows with GYN: yes.   - Menopause: premenopausal.     Review of Systems   Constitutional: Negative.    HENT: Negative.     Eyes: Negative.    Respiratory: Negative.     Cardiovascular: Negative.    Gastrointestinal: Negative.    Endocrine: Negative.    Genitourinary: Negative.    Musculoskeletal: Negative.    Skin: Negative.    Allergic/Immunologic: Negative.    Neurological: Negative.    Hematological: Negative.    Psychiatric/Behavioral:  Positive for dysphoric mood. The patient is nervous/anxious.      Pertinent Medical History       Objective     /80   Pulse 68   Temp (!) 97 °F (36.1 °C)   Resp 16   Ht 5' 7\" (1.702 m)   Wt 66.2 kg (146 lb)   LMP  (Within Weeks) Comment: 2 weeks ago  BMI 22.87 kg/m²     Physical Exam  Vitals and nursing note reviewed.   Constitutional:       General: She is not in acute distress.     Appearance: She is well-developed.   HENT:      Head: Normocephalic and atraumatic.      Right Ear: Tympanic membrane, ear canal and external ear normal. There is no impacted cerumen.      Left Ear: Tympanic membrane, ear canal and external ear normal. There is no impacted cerumen.      Nose: Nose normal.      Mouth/Throat:      Mouth: Mucous membranes are moist.   Eyes:      Conjunctiva/sclera: Conjunctivae normal.   Cardiovascular:      Rate and Rhythm: Normal rate and regular rhythm.      Heart sounds: No murmur heard.  Pulmonary:      Effort: Pulmonary effort is normal. No respiratory distress.      Breath sounds: Normal breath sounds.   Abdominal:      General: Abdomen is flat. There is no distension.      Palpations: Abdomen is soft. There is no mass.      " Tenderness: There is no abdominal tenderness. There is no guarding or rebound.      Hernia: No hernia is present.   Musculoskeletal:         General: Normal range of motion.      Cervical back: Neck supple.   Skin:     General: Skin is warm and dry.   Neurological:      General: No focal deficit present.      Mental Status: She is alert and oriented to person, place, and time.

## 2024-11-15 LAB
25(OH)D3+25(OH)D2 SERPL-MCNC: 22.5 NG/ML (ref 30–100)
ALBUMIN SERPL-MCNC: 4.9 G/DL (ref 4–5)
ALP SERPL-CCNC: 63 IU/L (ref 44–121)
ALT SERPL-CCNC: 20 IU/L (ref 0–32)
AST SERPL-CCNC: 21 IU/L (ref 0–40)
BILIRUB SERPL-MCNC: 1.2 MG/DL (ref 0–1.2)
BUN SERPL-MCNC: 11 MG/DL (ref 6–20)
BUN/CREAT SERPL: 14 (ref 9–23)
CALCIUM SERPL-MCNC: 9.6 MG/DL (ref 8.7–10.2)
CHLORIDE SERPL-SCNC: 102 MMOL/L (ref 96–106)
CHOLEST SERPL-MCNC: 205 MG/DL (ref 100–199)
CO2 SERPL-SCNC: 23 MMOL/L (ref 20–29)
CREAT SERPL-MCNC: 0.8 MG/DL (ref 0.57–1)
EGFR: 107 ML/MIN/1.73
ERYTHROCYTE [DISTWIDTH] IN BLOOD BY AUTOMATED COUNT: 11.8 % (ref 11.7–15.4)
GLOBULIN SER-MCNC: 2.2 G/DL (ref 1.5–4.5)
GLUCOSE SERPL-MCNC: 85 MG/DL (ref 70–99)
HCT VFR BLD AUTO: 41.3 % (ref 34–46.6)
HCV AB S/CO SERPL IA: NON REACTIVE
HDLC SERPL-MCNC: 70 MG/DL
HGB BLD-MCNC: 13.6 G/DL (ref 11.1–15.9)
LDLC SERPL CALC-MCNC: 125 MG/DL (ref 0–99)
LDLC/HDLC SERPL: 1.8 RATIO (ref 0–3.2)
MCH RBC QN AUTO: 30.9 PG (ref 26.6–33)
MCHC RBC AUTO-ENTMCNC: 32.9 G/DL (ref 31.5–35.7)
MCV RBC AUTO: 94 FL (ref 79–97)
MICRODELETION SYND BLD/T FISH: NORMAL
PLATELET # BLD AUTO: 281 X10E3/UL (ref 150–450)
POTASSIUM SERPL-SCNC: 4.7 MMOL/L (ref 3.5–5.2)
PROT SERPL-MCNC: 7.1 G/DL (ref 6–8.5)
RBC # BLD AUTO: 4.4 X10E6/UL (ref 3.77–5.28)
SL AMB VLDL CHOLESTEROL CALC: 10 MG/DL (ref 5–40)
SODIUM SERPL-SCNC: 141 MMOL/L (ref 134–144)
TRIGL SERPL-MCNC: 53 MG/DL (ref 0–149)
TSH SERPL DL<=0.005 MIU/L-ACNC: 1.09 UIU/ML (ref 0.45–4.5)
VIT B12 SERPL-MCNC: 544 PG/ML (ref 232–1245)
WBC # BLD AUTO: 5.1 X10E3/UL (ref 3.4–10.8)

## 2024-11-18 ENCOUNTER — RESULTS FOLLOW-UP (OUTPATIENT)
Dept: FAMILY MEDICINE CLINIC | Facility: CLINIC | Age: 24
End: 2024-11-18

## 2024-11-19 LAB
C TRACH RRNA SPEC QL NAA+PROBE: NEGATIVE
N GONORRHOEA RRNA SPEC QL NAA+PROBE: NEGATIVE

## 2025-07-16 ENCOUNTER — OFFICE VISIT (OUTPATIENT)
Dept: FAMILY MEDICINE CLINIC | Facility: CLINIC | Age: 25
End: 2025-07-16
Payer: COMMERCIAL

## 2025-07-16 VITALS
TEMPERATURE: 98.1 F | SYSTOLIC BLOOD PRESSURE: 94 MMHG | HEART RATE: 82 BPM | DIASTOLIC BLOOD PRESSURE: 58 MMHG | WEIGHT: 146 LBS | HEIGHT: 67 IN | BODY MASS INDEX: 22.91 KG/M2 | RESPIRATION RATE: 16 BRPM

## 2025-07-16 DIAGNOSIS — R10.13 EPIGASTRIC DISCOMFORT: ICD-10-CM

## 2025-07-16 DIAGNOSIS — J45.990 EXERCISE-INDUCED ASTHMA: ICD-10-CM

## 2025-07-16 DIAGNOSIS — N83.201 CYST OF RIGHT OVARY: Primary | ICD-10-CM

## 2025-07-16 DIAGNOSIS — F32.2 SEVERE MAJOR DEPRESSIVE DISORDER (HCC): ICD-10-CM

## 2025-07-16 PROCEDURE — 99214 OFFICE O/P EST MOD 30 MIN: CPT | Performed by: FAMILY MEDICINE

## 2025-07-16 NOTE — PROGRESS NOTES
"Name: Liliana Crawford      : 2000      MRN: [id number]  Encounter Provider: Abigail Bernal MD  Encounter Date: 2025   Encounter department: [facility]  :  Assessment & Plan  Cyst of right ovary    Orders:    US pelvis complete w transvaginal; Future    Epigastric discomfort    Orders:    US abdomen complete; Future    Severe major depressive disorder (HCC)           Exercise-induced asthma                History of Present Illness   HPI  She feels a popping sensation in her abdomen intermittently. Mainly when she coughs or laughs hard.   No significant pain, but feels like a discomfort.   Denies nausea, vomiting, fevers/chills, blood in the stool, weight changes.   Review of Systems   Constitutional: Negative.    HENT: Negative.     Eyes: Negative.    Respiratory: Negative.     Cardiovascular: Negative.    Gastrointestinal: Negative.    Endocrine: Negative.    Genitourinary: Negative.    Musculoskeletal: Negative.    Skin: Negative.    Allergic/Immunologic: Negative.    Neurological: Negative.    Hematological: Negative.    Psychiatric/Behavioral: Negative.         Objective   BP 94/58   Pulse 82   Temp 98.1 °F (36.7 °C)   Resp 16   Ht 5' 7\" (1.702 m)   Wt 66.2 kg (146 lb)   BMI 22.87 kg/m²      Physical Exam  Constitutional:       General: She is not in acute distress.     Appearance: She is well-developed. She is not diaphoretic.   HENT:      Head: Normocephalic and atraumatic.     Cardiovascular:      Rate and Rhythm: Normal rate and regular rhythm.      Heart sounds: Normal heart sounds. No murmur heard.     No friction rub. No gallop.   Pulmonary:      Effort: Pulmonary effort is normal. No respiratory distress.      Breath sounds: Normal breath sounds. No wheezing or rales.   Chest:      Chest wall: No tenderness.   Abdominal:      General: Abdomen is flat. There is no distension.      Palpations: Abdomen is soft. There is no mass.      Tenderness: There is no abdominal " tenderness. There is no right CVA tenderness, left CVA tenderness, guarding or rebound.      Hernia: No hernia is present.     Musculoskeletal:         General: No deformity. Normal range of motion.      Cervical back: Normal range of motion and neck supple.     Skin:     General: Skin is warm and dry.     Neurological:      General: No focal deficit present.      Mental Status: She is alert and oriented to person, place, and time.     Psychiatric:         Behavior: Behavior normal.         Thought Content: Thought content normal.         Judgment: Judgment normal.

## 2025-08-15 ENCOUNTER — HOSPITAL ENCOUNTER (OUTPATIENT)
Dept: RADIOLOGY | Facility: HOSPITAL | Age: 25
Discharge: HOME | End: 2025-08-15
Attending: FAMILY MEDICINE
Payer: COMMERCIAL